# Patient Record
Sex: FEMALE | Race: WHITE | NOT HISPANIC OR LATINO
[De-identification: names, ages, dates, MRNs, and addresses within clinical notes are randomized per-mention and may not be internally consistent; named-entity substitution may affect disease eponyms.]

---

## 2019-10-01 ENCOUNTER — TRANSCRIPTION ENCOUNTER (OUTPATIENT)
Age: 84
End: 2019-10-01

## 2019-10-01 ENCOUNTER — INPATIENT (INPATIENT)
Facility: HOSPITAL | Age: 84
LOS: 2 days | Discharge: NURSING, REHAB OR LONG-TERM CARE | End: 2019-10-04
Attending: ORTHOPAEDIC SURGERY | Admitting: ORTHOPAEDIC SURGERY
Payer: MEDICARE

## 2019-10-01 VITALS
RESPIRATION RATE: 18 BRPM | WEIGHT: 119.93 LBS | TEMPERATURE: 98 F | HEART RATE: 94 BPM | OXYGEN SATURATION: 95 % | DIASTOLIC BLOOD PRESSURE: 77 MMHG | SYSTOLIC BLOOD PRESSURE: 155 MMHG

## 2019-10-01 DIAGNOSIS — S72.001A FRACTURE OF UNSPECIFIED PART OF NECK OF RIGHT FEMUR, INITIAL ENCOUNTER FOR CLOSED FRACTURE: ICD-10-CM

## 2019-10-01 LAB
ABO RH CONFIRMATION: SIGNIFICANT CHANGE UP
ALBUMIN SERPL ELPH-MCNC: 3.4 G/DL — SIGNIFICANT CHANGE UP (ref 3.3–5)
ALP SERPL-CCNC: 58 U/L — SIGNIFICANT CHANGE UP (ref 40–120)
ALT FLD-CCNC: 23 U/L — SIGNIFICANT CHANGE UP (ref 12–78)
ANION GAP SERPL CALC-SCNC: 9 MMOL/L — SIGNIFICANT CHANGE UP (ref 5–17)
APPEARANCE UR: CLEAR — SIGNIFICANT CHANGE UP
APTT BLD: 27 SEC — LOW (ref 27.5–36.3)
AST SERPL-CCNC: 17 U/L — SIGNIFICANT CHANGE UP (ref 15–37)
BILIRUB SERPL-MCNC: 0.6 MG/DL — SIGNIFICANT CHANGE UP (ref 0.2–1.2)
BILIRUB UR-MCNC: NEGATIVE — SIGNIFICANT CHANGE UP
BUN SERPL-MCNC: 12 MG/DL — SIGNIFICANT CHANGE UP (ref 7–23)
CALCIUM SERPL-MCNC: 9.4 MG/DL — SIGNIFICANT CHANGE UP (ref 8.5–10.1)
CHLORIDE SERPL-SCNC: 101 MMOL/L — SIGNIFICANT CHANGE UP (ref 96–108)
CO2 SERPL-SCNC: 27 MMOL/L — SIGNIFICANT CHANGE UP (ref 22–31)
COLOR SPEC: YELLOW — SIGNIFICANT CHANGE UP
CREAT SERPL-MCNC: 0.83 MG/DL — SIGNIFICANT CHANGE UP (ref 0.5–1.3)
DIFF PNL FLD: NEGATIVE — SIGNIFICANT CHANGE UP
GLUCOSE SERPL-MCNC: 136 MG/DL — HIGH (ref 70–99)
GLUCOSE UR QL: NEGATIVE MG/DL — SIGNIFICANT CHANGE UP
HCT VFR BLD CALC: 41 % — SIGNIFICANT CHANGE UP (ref 34.5–45)
HGB BLD-MCNC: 13.4 G/DL — SIGNIFICANT CHANGE UP (ref 11.5–15.5)
INR BLD: 1.08 RATIO — SIGNIFICANT CHANGE UP (ref 0.88–1.16)
KETONES UR-MCNC: NEGATIVE — SIGNIFICANT CHANGE UP
LEUKOCYTE ESTERASE UR-ACNC: NEGATIVE — SIGNIFICANT CHANGE UP
MCHC RBC-ENTMCNC: 27.7 PG — SIGNIFICANT CHANGE UP (ref 27–34)
MCHC RBC-ENTMCNC: 32.7 GM/DL — SIGNIFICANT CHANGE UP (ref 32–36)
MCV RBC AUTO: 84.7 FL — SIGNIFICANT CHANGE UP (ref 80–100)
NITRITE UR-MCNC: NEGATIVE — SIGNIFICANT CHANGE UP
NRBC # BLD: 0 /100 WBCS — SIGNIFICANT CHANGE UP (ref 0–0)
PH UR: 6.5 — SIGNIFICANT CHANGE UP (ref 5–8)
PLATELET # BLD AUTO: 165 K/UL — SIGNIFICANT CHANGE UP (ref 150–400)
POTASSIUM SERPL-MCNC: 4.1 MMOL/L — SIGNIFICANT CHANGE UP (ref 3.5–5.3)
POTASSIUM SERPL-SCNC: 4.1 MMOL/L — SIGNIFICANT CHANGE UP (ref 3.5–5.3)
PROT SERPL-MCNC: 7.5 GM/DL — SIGNIFICANT CHANGE UP (ref 6–8.3)
PROT UR-MCNC: NEGATIVE MG/DL — SIGNIFICANT CHANGE UP
PROTHROM AB SERPL-ACNC: 12.1 SEC — SIGNIFICANT CHANGE UP (ref 10–12.9)
RBC # BLD: 4.84 M/UL — SIGNIFICANT CHANGE UP (ref 3.8–5.2)
RBC # FLD: 15.9 % — HIGH (ref 10.3–14.5)
SODIUM SERPL-SCNC: 137 MMOL/L — SIGNIFICANT CHANGE UP (ref 135–145)
SP GR SPEC: 1.01 — SIGNIFICANT CHANGE UP (ref 1.01–1.02)
TROPONIN I SERPL-MCNC: <.015 NG/ML — SIGNIFICANT CHANGE UP (ref 0.01–0.04)
UROBILINOGEN FLD QL: NEGATIVE MG/DL — SIGNIFICANT CHANGE UP
WBC # BLD: 8.07 K/UL — SIGNIFICANT CHANGE UP (ref 3.8–10.5)
WBC # FLD AUTO: 8.07 K/UL — SIGNIFICANT CHANGE UP (ref 3.8–10.5)

## 2019-10-01 PROCEDURE — 88305 TISSUE EXAM BY PATHOLOGIST: CPT | Mod: 26

## 2019-10-01 PROCEDURE — 93010 ELECTROCARDIOGRAM REPORT: CPT

## 2019-10-01 PROCEDURE — 99285 EMERGENCY DEPT VISIT HI MDM: CPT

## 2019-10-01 PROCEDURE — 73552 X-RAY EXAM OF FEMUR 2/>: CPT | Mod: 26,RT

## 2019-10-01 PROCEDURE — 73502 X-RAY EXAM HIP UNI 2-3 VIEWS: CPT | Mod: 26,RT

## 2019-10-01 PROCEDURE — 70450 CT HEAD/BRAIN W/O DYE: CPT | Mod: 26

## 2019-10-01 PROCEDURE — 76377 3D RENDER W/INTRP POSTPROCES: CPT | Mod: 26

## 2019-10-01 PROCEDURE — 71045 X-RAY EXAM CHEST 1 VIEW: CPT | Mod: 26

## 2019-10-01 PROCEDURE — 88311 DECALCIFY TISSUE: CPT | Mod: 26

## 2019-10-01 PROCEDURE — 72192 CT PELVIS W/O DYE: CPT | Mod: 26

## 2019-10-01 PROCEDURE — 99222 1ST HOSP IP/OBS MODERATE 55: CPT

## 2019-10-01 RX ORDER — SERTRALINE 25 MG/1
100 TABLET, FILM COATED ORAL DAILY
Refills: 0 | Status: DISCONTINUED | OUTPATIENT
Start: 2019-10-01 | End: 2019-10-02

## 2019-10-01 RX ORDER — DONEPEZIL HYDROCHLORIDE 10 MG/1
1 TABLET, FILM COATED ORAL
Qty: 0 | Refills: 0 | DISCHARGE

## 2019-10-01 RX ORDER — ENOXAPARIN SODIUM 100 MG/ML
40 INJECTION SUBCUTANEOUS ONCE
Refills: 0 | Status: COMPLETED | OUTPATIENT
Start: 2019-10-01 | End: 2019-10-01

## 2019-10-01 RX ORDER — SIMVASTATIN 20 MG/1
20 TABLET, FILM COATED ORAL AT BEDTIME
Refills: 0 | Status: DISCONTINUED | OUTPATIENT
Start: 2019-10-01 | End: 2019-10-02

## 2019-10-01 RX ORDER — MORPHINE SULFATE 50 MG/1
2 CAPSULE, EXTENDED RELEASE ORAL ONCE
Refills: 0 | Status: DISCONTINUED | OUTPATIENT
Start: 2019-10-01 | End: 2019-10-01

## 2019-10-01 RX ORDER — METOPROLOL TARTRATE 50 MG
50 TABLET ORAL DAILY
Refills: 0 | Status: DISCONTINUED | OUTPATIENT
Start: 2019-10-01 | End: 2019-10-02

## 2019-10-01 RX ORDER — SIMVASTATIN 20 MG/1
1 TABLET, FILM COATED ORAL
Qty: 0 | Refills: 0 | DISCHARGE

## 2019-10-01 RX ORDER — SODIUM CHLORIDE 9 MG/ML
1000 INJECTION, SOLUTION INTRAVENOUS
Refills: 0 | Status: DISCONTINUED | OUTPATIENT
Start: 2019-10-01 | End: 2019-10-02

## 2019-10-01 RX ORDER — SERTRALINE 25 MG/1
1 TABLET, FILM COATED ORAL
Qty: 0 | Refills: 0 | DISCHARGE

## 2019-10-01 RX ORDER — RISPERIDONE 4 MG/1
2 TABLET ORAL
Refills: 0 | Status: DISCONTINUED | OUTPATIENT
Start: 2019-10-01 | End: 2019-10-02

## 2019-10-01 RX ORDER — INFLUENZA VIRUS VACCINE 15; 15; 15; 15 UG/.5ML; UG/.5ML; UG/.5ML; UG/.5ML
0.5 SUSPENSION INTRAMUSCULAR ONCE
Refills: 0 | Status: COMPLETED | OUTPATIENT
Start: 2019-10-01 | End: 2019-10-01

## 2019-10-01 RX ORDER — DOCUSATE SODIUM 100 MG
100 CAPSULE ORAL THREE TIMES A DAY
Refills: 0 | Status: DISCONTINUED | OUTPATIENT
Start: 2019-10-01 | End: 2019-10-02

## 2019-10-01 RX ORDER — DONEPEZIL HYDROCHLORIDE 10 MG/1
5 TABLET, FILM COATED ORAL AT BEDTIME
Refills: 0 | Status: DISCONTINUED | OUTPATIENT
Start: 2019-10-01 | End: 2019-10-02

## 2019-10-01 RX ORDER — OXYCODONE HYDROCHLORIDE 5 MG/1
5 TABLET ORAL EVERY 6 HOURS
Refills: 0 | Status: DISCONTINUED | OUTPATIENT
Start: 2019-10-01 | End: 2019-10-02

## 2019-10-01 RX ORDER — METOPROLOL TARTRATE 50 MG
1 TABLET ORAL
Qty: 0 | Refills: 0 | DISCHARGE

## 2019-10-01 RX ORDER — RISPERIDONE 4 MG/1
1 TABLET ORAL
Qty: 0 | Refills: 0 | DISCHARGE

## 2019-10-01 RX ADMIN — SIMVASTATIN 20 MILLIGRAM(S): 20 TABLET, FILM COATED ORAL at 23:14

## 2019-10-01 RX ADMIN — Medication 100 MILLIGRAM(S): at 23:14

## 2019-10-01 RX ADMIN — ENOXAPARIN SODIUM 40 MILLIGRAM(S): 100 INJECTION SUBCUTANEOUS at 16:03

## 2019-10-01 RX ADMIN — MORPHINE SULFATE 2 MILLIGRAM(S): 50 CAPSULE, EXTENDED RELEASE ORAL at 14:29

## 2019-10-01 RX ADMIN — DONEPEZIL HYDROCHLORIDE 5 MILLIGRAM(S): 10 TABLET, FILM COATED ORAL at 23:14

## 2019-10-01 RX ADMIN — RISPERIDONE 2 MILLIGRAM(S): 4 TABLET ORAL at 17:59

## 2019-10-01 RX ADMIN — MORPHINE SULFATE 2 MILLIGRAM(S): 50 CAPSULE, EXTENDED RELEASE ORAL at 14:24

## 2019-10-01 NOTE — CONSULT NOTE ADULT - PROBLEM SELECTOR RECOMMENDATION 9
Consulted for medical clearance  Please get TTE in am and if normal - proceed to surgery with low risk

## 2019-10-01 NOTE — CONSULT NOTE ADULT - ASSESSMENT
Patient is an 83 YO F with a PMH of Dementia, HTN, and HLD who presented to the ED s/p mechanical fall and found to have R hip fracture.  Consulted by surgery for medical clearance.  Patient has dementia and HTN but no other significant comorbidities.  Will recommend TTE as functional status is questionable secondary to patient's dementia    Will order bilateral mittens as patient continues to fidget and pull at gregg cath    If TTE with abnormal findings - patient can proceed to surgery with low risk of perioperative cardiovascular mortality.

## 2019-10-01 NOTE — ED PROVIDER NOTE - OBJECTIVE STATEMENT
83yo female from home with pmh dementia, HL, HTN,  presents s/p fall.  heard her fall and came around the corner and she was on the ground and awake.  noted her gait has been off and having rt hip pain which he attributed to arthritis. Pt is AOx1. Pt has been unable to ambulate since.    Per family, no fever, vomiting, pain, sob, diarrhea

## 2019-10-01 NOTE — ED PROVIDER NOTE - DISPOSITION TYPE
PHYSICAL THERAPY KNEE TREATMENT NOTE - INPATIENT     Room Number: 756/928-K             Presenting Problem: R TKA    Problem List  Principal Problem:    Arthritis of right knee  Active Problems:    S/P knee replacement    Essential hypertension    Renal in Degree of Impairment Score: 50.57%   Standardized Score (AM-PAC Scale): 42.13   CMS Modifier (G-Code): CK    FUNCTIONAL ABILITY STATUS  Gait Assessment   Gait Assistance: Minimum assistance  Distance (ft): 75  Assistive Device: Rolling walker  Pattern: R D ADMIT

## 2019-10-01 NOTE — ED ADULT TRIAGE NOTE - CHIEF COMPLAINT QUOTE
pt BIB  with c/o fall yesterday now p/w right hip pain, unable to ambulate., unsure of the mechanism unsure if she struck her head, not on any anticoagulants no obvious head trauma, at baseline mental status

## 2019-10-01 NOTE — H&P ADULT - PROBLEM SELECTOR PLAN 1
Pain Control  Admit to Ortho   Pre-op   DVT prophylaxis   F/U Medical Optimization   D/W Dr. Rani Zurita to F/U

## 2019-10-01 NOTE — H&P ADULT - NSHPPHYSICALEXAM_GEN_ALL_CORE
Right Hip/LE: Skin C/D/I, (+) TTP, (+) Pain with Log Roll, Sensation/motor intact, DP 2+, FROM ankle/toes

## 2019-10-01 NOTE — H&P ADULT - HISTORY OF PRESENT ILLNESS
Patient S/P Fall at Home, as per  and Son, Patient was walking into another room when  heard her fall, Patient C/O Right Hip pain, Patient is a Poor Historian due to PMHx of Dementia

## 2019-10-01 NOTE — CONSULT NOTE ADULT - SUBJECTIVE AND OBJECTIVE BOX
Patient is an 85 YO F with a PMH of Dementia, HTN, and HLD who presented to the ED s/p mechanical fall and found to have R hip fracture.  Consulted by surgery for medical clearance.      Patient seen and examined at the bedside  AAOx1 - unable to provide history however some history provided by family members at the bedside.    Patient reportedly was able to get out of bed and ambulate without assistance prior to her fall  Patient reportedly has a smoking history but quit over 30 years ago.    Family denies hx of MI, reports this is her first hospitalization    Vitals T97.6 HR 83 /77  Physical exam:  General: patient in no acute distress, resting comfortably, pulling at gregg catheter  Cardio: S1/S2 +ve, regular rate and rhythm, no M/G/R  Resp: clear to ausculation bilaterally, no rales or wheezes  GI: abdomen soft, nontender, non distended, no guarding, BS +ve x 4  : gregg in place  Ext: no significant pedal edema

## 2019-10-02 ENCOUNTER — RESULT REVIEW (OUTPATIENT)
Age: 84
End: 2019-10-02

## 2019-10-02 LAB
ANION GAP SERPL CALC-SCNC: 7 MMOL/L — SIGNIFICANT CHANGE UP (ref 5–17)
ANION GAP SERPL CALC-SCNC: 9 MMOL/L — SIGNIFICANT CHANGE UP (ref 5–17)
BASOPHILS # BLD AUTO: 0.02 K/UL — SIGNIFICANT CHANGE UP (ref 0–0.2)
BASOPHILS NFR BLD AUTO: 0.3 % — SIGNIFICANT CHANGE UP (ref 0–2)
BUN SERPL-MCNC: 10 MG/DL — SIGNIFICANT CHANGE UP (ref 7–23)
BUN SERPL-MCNC: 11 MG/DL — SIGNIFICANT CHANGE UP (ref 7–23)
CALCIUM SERPL-MCNC: 8.3 MG/DL — LOW (ref 8.5–10.1)
CALCIUM SERPL-MCNC: 8.6 MG/DL — SIGNIFICANT CHANGE UP (ref 8.5–10.1)
CHLORIDE SERPL-SCNC: 102 MMOL/L — SIGNIFICANT CHANGE UP (ref 96–108)
CHLORIDE SERPL-SCNC: 102 MMOL/L — SIGNIFICANT CHANGE UP (ref 96–108)
CO2 SERPL-SCNC: 26 MMOL/L — SIGNIFICANT CHANGE UP (ref 22–31)
CO2 SERPL-SCNC: 27 MMOL/L — SIGNIFICANT CHANGE UP (ref 22–31)
CREAT SERPL-MCNC: 0.7 MG/DL — SIGNIFICANT CHANGE UP (ref 0.5–1.3)
CREAT SERPL-MCNC: 0.85 MG/DL — SIGNIFICANT CHANGE UP (ref 0.5–1.3)
CULTURE RESULTS: SIGNIFICANT CHANGE UP
EOSINOPHIL # BLD AUTO: 0.29 K/UL — SIGNIFICANT CHANGE UP (ref 0–0.5)
EOSINOPHIL NFR BLD AUTO: 4.8 % — SIGNIFICANT CHANGE UP (ref 0–6)
GLUCOSE SERPL-MCNC: 101 MG/DL — HIGH (ref 70–99)
GLUCOSE SERPL-MCNC: 113 MG/DL — HIGH (ref 70–99)
HCT VFR BLD CALC: 35.9 % — SIGNIFICANT CHANGE UP (ref 34.5–45)
HCT VFR BLD CALC: 37 % — SIGNIFICANT CHANGE UP (ref 34.5–45)
HGB BLD-MCNC: 11.7 G/DL — SIGNIFICANT CHANGE UP (ref 11.5–15.5)
HGB BLD-MCNC: 12 G/DL — SIGNIFICANT CHANGE UP (ref 11.5–15.5)
IMM GRANULOCYTES NFR BLD AUTO: 0.3 % — SIGNIFICANT CHANGE UP (ref 0–1.5)
LYMPHOCYTES # BLD AUTO: 0.92 K/UL — LOW (ref 1–3.3)
LYMPHOCYTES # BLD AUTO: 15.3 % — SIGNIFICANT CHANGE UP (ref 13–44)
MCHC RBC-ENTMCNC: 27.5 PG — SIGNIFICANT CHANGE UP (ref 27–34)
MCHC RBC-ENTMCNC: 27.9 PG — SIGNIFICANT CHANGE UP (ref 27–34)
MCHC RBC-ENTMCNC: 32.4 GM/DL — SIGNIFICANT CHANGE UP (ref 32–36)
MCHC RBC-ENTMCNC: 32.6 GM/DL — SIGNIFICANT CHANGE UP (ref 32–36)
MCV RBC AUTO: 84.3 FL — SIGNIFICANT CHANGE UP (ref 80–100)
MCV RBC AUTO: 86 FL — SIGNIFICANT CHANGE UP (ref 80–100)
MONOCYTES # BLD AUTO: 0.64 K/UL — SIGNIFICANT CHANGE UP (ref 0–0.9)
MONOCYTES NFR BLD AUTO: 10.6 % — SIGNIFICANT CHANGE UP (ref 2–14)
NEUTROPHILS # BLD AUTO: 4.12 K/UL — SIGNIFICANT CHANGE UP (ref 1.8–7.4)
NEUTROPHILS NFR BLD AUTO: 68.7 % — SIGNIFICANT CHANGE UP (ref 43–77)
NRBC # BLD: 0 /100 WBCS — SIGNIFICANT CHANGE UP (ref 0–0)
NRBC # BLD: 0 /100 WBCS — SIGNIFICANT CHANGE UP (ref 0–0)
PLATELET # BLD AUTO: 145 K/UL — LOW (ref 150–400)
PLATELET # BLD AUTO: 154 K/UL — SIGNIFICANT CHANGE UP (ref 150–400)
POTASSIUM SERPL-MCNC: 4.1 MMOL/L — SIGNIFICANT CHANGE UP (ref 3.5–5.3)
POTASSIUM SERPL-MCNC: 4.2 MMOL/L — SIGNIFICANT CHANGE UP (ref 3.5–5.3)
POTASSIUM SERPL-SCNC: 4.1 MMOL/L — SIGNIFICANT CHANGE UP (ref 3.5–5.3)
POTASSIUM SERPL-SCNC: 4.2 MMOL/L — SIGNIFICANT CHANGE UP (ref 3.5–5.3)
RBC # BLD: 4.26 M/UL — SIGNIFICANT CHANGE UP (ref 3.8–5.2)
RBC # BLD: 4.3 M/UL — SIGNIFICANT CHANGE UP (ref 3.8–5.2)
RBC # FLD: 16.2 % — HIGH (ref 10.3–14.5)
RBC # FLD: 16.4 % — HIGH (ref 10.3–14.5)
SODIUM SERPL-SCNC: 136 MMOL/L — SIGNIFICANT CHANGE UP (ref 135–145)
SODIUM SERPL-SCNC: 137 MMOL/L — SIGNIFICANT CHANGE UP (ref 135–145)
SPECIMEN SOURCE: SIGNIFICANT CHANGE UP
WBC # BLD: 6.01 K/UL — SIGNIFICANT CHANGE UP (ref 3.8–10.5)
WBC # BLD: 7.24 K/UL — SIGNIFICANT CHANGE UP (ref 3.8–10.5)
WBC # FLD AUTO: 6.01 K/UL — SIGNIFICANT CHANGE UP (ref 3.8–10.5)
WBC # FLD AUTO: 7.24 K/UL — SIGNIFICANT CHANGE UP (ref 3.8–10.5)

## 2019-10-02 PROCEDURE — 99233 SBSQ HOSP IP/OBS HIGH 50: CPT

## 2019-10-02 RX ORDER — RISPERIDONE 4 MG/1
2 TABLET ORAL
Refills: 0 | Status: DISCONTINUED | OUTPATIENT
Start: 2019-10-02 | End: 2019-10-04

## 2019-10-02 RX ORDER — SENNA PLUS 8.6 MG/1
2 TABLET ORAL AT BEDTIME
Refills: 0 | Status: DISCONTINUED | OUTPATIENT
Start: 2019-10-02 | End: 2019-10-04

## 2019-10-02 RX ORDER — CEFAZOLIN SODIUM 1 G
2000 VIAL (EA) INJECTION EVERY 8 HOURS
Refills: 0 | Status: COMPLETED | OUTPATIENT
Start: 2019-10-03 | End: 2019-10-03

## 2019-10-02 RX ORDER — ASCORBIC ACID 60 MG
500 TABLET,CHEWABLE ORAL
Refills: 0 | Status: DISCONTINUED | OUTPATIENT
Start: 2019-10-02 | End: 2019-10-04

## 2019-10-02 RX ORDER — SODIUM CHLORIDE 9 MG/ML
1000 INJECTION, SOLUTION INTRAVENOUS
Refills: 0 | Status: DISCONTINUED | OUTPATIENT
Start: 2019-10-02 | End: 2019-10-02

## 2019-10-02 RX ORDER — METOPROLOL TARTRATE 50 MG
50 TABLET ORAL DAILY
Refills: 0 | Status: DISCONTINUED | OUTPATIENT
Start: 2019-10-02 | End: 2019-10-04

## 2019-10-02 RX ORDER — SIMVASTATIN 20 MG/1
20 TABLET, FILM COATED ORAL AT BEDTIME
Refills: 0 | Status: DISCONTINUED | OUTPATIENT
Start: 2019-10-02 | End: 2019-10-04

## 2019-10-02 RX ORDER — MAGNESIUM HYDROXIDE 400 MG/1
30 TABLET, CHEWABLE ORAL EVERY 12 HOURS
Refills: 0 | Status: DISCONTINUED | OUTPATIENT
Start: 2019-10-02 | End: 2019-10-04

## 2019-10-02 RX ORDER — SERTRALINE 25 MG/1
100 TABLET, FILM COATED ORAL DAILY
Refills: 0 | Status: DISCONTINUED | OUTPATIENT
Start: 2019-10-02 | End: 2019-10-04

## 2019-10-02 RX ORDER — TRAMADOL HYDROCHLORIDE 50 MG/1
50 TABLET ORAL EVERY 6 HOURS
Refills: 0 | Status: DISCONTINUED | OUTPATIENT
Start: 2019-10-02 | End: 2019-10-03

## 2019-10-02 RX ORDER — FENTANYL CITRATE 50 UG/ML
50 INJECTION INTRAVENOUS ONCE
Refills: 0 | Status: DISCONTINUED | OUTPATIENT
Start: 2019-10-02 | End: 2019-10-02

## 2019-10-02 RX ORDER — ACETAMINOPHEN 500 MG
1000 TABLET ORAL ONCE
Refills: 0 | Status: COMPLETED | OUTPATIENT
Start: 2019-10-02 | End: 2019-10-02

## 2019-10-02 RX ORDER — ACETAMINOPHEN 500 MG
975 TABLET ORAL EVERY 8 HOURS
Refills: 0 | Status: DISCONTINUED | OUTPATIENT
Start: 2019-10-02 | End: 2019-10-04

## 2019-10-02 RX ORDER — DOCUSATE SODIUM 100 MG
100 CAPSULE ORAL THREE TIMES A DAY
Refills: 0 | Status: DISCONTINUED | OUTPATIENT
Start: 2019-10-02 | End: 2019-10-04

## 2019-10-02 RX ORDER — HYDROMORPHONE HYDROCHLORIDE 2 MG/ML
0.5 INJECTION INTRAMUSCULAR; INTRAVENOUS; SUBCUTANEOUS
Refills: 0 | Status: DISCONTINUED | OUTPATIENT
Start: 2019-10-02 | End: 2019-10-02

## 2019-10-02 RX ORDER — FOLIC ACID 0.8 MG
1 TABLET ORAL DAILY
Refills: 0 | Status: DISCONTINUED | OUTPATIENT
Start: 2019-10-02 | End: 2019-10-04

## 2019-10-02 RX ORDER — ENOXAPARIN SODIUM 100 MG/ML
40 INJECTION SUBCUTANEOUS DAILY
Refills: 0 | Status: DISCONTINUED | OUTPATIENT
Start: 2019-10-03 | End: 2019-10-04

## 2019-10-02 RX ORDER — ONDANSETRON 8 MG/1
4 TABLET, FILM COATED ORAL EVERY 6 HOURS
Refills: 0 | Status: DISCONTINUED | OUTPATIENT
Start: 2019-10-02 | End: 2019-10-04

## 2019-10-02 RX ORDER — DONEPEZIL HYDROCHLORIDE 10 MG/1
5 TABLET, FILM COATED ORAL AT BEDTIME
Refills: 0 | Status: DISCONTINUED | OUTPATIENT
Start: 2019-10-02 | End: 2019-10-04

## 2019-10-02 RX ORDER — POLYETHYLENE GLYCOL 3350 17 G/17G
17 POWDER, FOR SOLUTION ORAL DAILY
Refills: 0 | Status: DISCONTINUED | OUTPATIENT
Start: 2019-10-02 | End: 2019-10-04

## 2019-10-02 RX ORDER — SODIUM CHLORIDE 9 MG/ML
1000 INJECTION INTRAMUSCULAR; INTRAVENOUS; SUBCUTANEOUS
Refills: 0 | Status: DISCONTINUED | OUTPATIENT
Start: 2019-10-02 | End: 2019-10-03

## 2019-10-02 RX ORDER — TRAMADOL HYDROCHLORIDE 50 MG/1
100 TABLET ORAL EVERY 6 HOURS
Refills: 0 | Status: DISCONTINUED | OUTPATIENT
Start: 2019-10-02 | End: 2019-10-03

## 2019-10-02 RX ORDER — ONDANSETRON 8 MG/1
4 TABLET, FILM COATED ORAL ONCE
Refills: 0 | Status: DISCONTINUED | OUTPATIENT
Start: 2019-10-02 | End: 2019-10-02

## 2019-10-02 RX ORDER — PANTOPRAZOLE SODIUM 20 MG/1
40 TABLET, DELAYED RELEASE ORAL
Refills: 0 | Status: DISCONTINUED | OUTPATIENT
Start: 2019-10-02 | End: 2019-10-04

## 2019-10-02 RX ADMIN — SODIUM CHLORIDE 100 MILLILITER(S): 9 INJECTION, SOLUTION INTRAVENOUS at 06:06

## 2019-10-02 RX ADMIN — Medication 100 MILLIGRAM(S): at 06:43

## 2019-10-02 RX ADMIN — Medication 50 MILLIGRAM(S): at 06:43

## 2019-10-02 RX ADMIN — Medication 975 MILLIGRAM(S): at 23:00

## 2019-10-02 RX ADMIN — RISPERIDONE 2 MILLIGRAM(S): 4 TABLET ORAL at 06:43

## 2019-10-02 RX ADMIN — RISPERIDONE 2 MILLIGRAM(S): 4 TABLET ORAL at 22:08

## 2019-10-02 RX ADMIN — DONEPEZIL HYDROCHLORIDE 5 MILLIGRAM(S): 10 TABLET, FILM COATED ORAL at 22:09

## 2019-10-02 RX ADMIN — Medication 1000 MILLIGRAM(S): at 17:45

## 2019-10-02 RX ADMIN — Medication 975 MILLIGRAM(S): at 22:08

## 2019-10-02 RX ADMIN — Medication 100 MILLIGRAM(S): at 22:09

## 2019-10-02 RX ADMIN — SERTRALINE 100 MILLIGRAM(S): 25 TABLET, FILM COATED ORAL at 11:31

## 2019-10-02 RX ADMIN — SIMVASTATIN 20 MILLIGRAM(S): 20 TABLET, FILM COATED ORAL at 22:08

## 2019-10-02 RX ADMIN — SODIUM CHLORIDE 100 MILLILITER(S): 9 INJECTION INTRAMUSCULAR; INTRAVENOUS; SUBCUTANEOUS at 20:09

## 2019-10-02 RX ADMIN — SODIUM CHLORIDE 75 MILLILITER(S): 9 INJECTION, SOLUTION INTRAVENOUS at 17:15

## 2019-10-02 RX ADMIN — Medication 400 MILLIGRAM(S): at 17:30

## 2019-10-02 NOTE — DISCHARGE NOTE PROVIDER - NSDCACTIVITY_GEN_ALL_CORE
Stairs allowed/No heavy lifting/straining/Walking - Indoors allowed/Do not make important decisions/Walking - Outdoors allowed/Do not drive or operate machinery/Showering allowed

## 2019-10-02 NOTE — DISCHARGE NOTE PROVIDER - CARE PROVIDER_API CALL
Ángel Saravia)  Orthopaedic Surgery  [address] [place] [address]  Phone: [phone]  Fax: [phone]  Follow Up Time:

## 2019-10-02 NOTE — PROGRESS NOTE ADULT - ASSESSMENT
Right Femoral Neck Fracture status post fall  Medically optimized for Ortho procedure  No need for echo to be done pre-op to assess functional status as the risk of not having procedure outweighs risk of surgery  continue low dose IV fluids - decreased dose to 50 cc/hr    Advanced dementia  supportive care  continue Aricept Right Femoral Neck Fracture status post fall  Medically optimized for Ortho procedure  No need for echo to be done pre-op to assess functional status as the risk of not having procedure outweighs risk of surgery  continue low dose IV fluids - decreased dose to 50 cc/hr  gregg in place - remove post-op when possible    Hypertension - stable  continue current dose of beta blocker    Hyperlipidemia  continue statin therapy    Advanced dementia  supportive care  continue Aricept

## 2019-10-02 NOTE — PROGRESS NOTE ADULT - SUBJECTIVE AND OBJECTIVE BOX
Patient is a 84y old  Female who presents with a chief complaint of Right Hip Fracture status post fall at home.    INTERVAL HPI/OVERNIGHT EVENTS:  Non-verbal, resting comfortably.    MEDICATIONS  (STANDING):  docusate sodium 100 milliGRAM(s) Oral three times a day  donepezil 5 milliGRAM(s) Oral at bedtime  influenza   Vaccine 0.5 milliLiter(s) IntraMuscular once  lactated ringers. 1000 milliLiter(s) (50 mL/Hr) IV Continuous <Continuous>  metoprolol succinate ER 50 milliGRAM(s) Oral daily  risperiDONE   Tablet 2 milliGRAM(s) Oral two times a day  sertraline 100 milliGRAM(s) Oral daily  simvastatin 20 milliGRAM(s) Oral at bedtime    MEDICATIONS  (PRN):  oxyCODONE    IR 5 milliGRAM(s) Oral every 6 hours PRN Pain 1-10    Allergies:  No Known Allergies    REVIEW OF SYSTEMS:  All other systems reviewed and are negative    Vital Signs Last 24 Hrs  T(C): 36.1 (02 Oct 2019 06:01), Max: 36.5 (02 Oct 2019 00:20)  T(F): 96.9 (02 Oct 2019 06:01), Max: 97.7 (02 Oct 2019 00:20)  HR: 83 (02 Oct 2019 06:01) (82 - 94)  BP: 120/69 (02 Oct 2019 06:01) (120/69 - 162/84)  BP(mean): 103 (01 Oct 2019 17:03) (103 - 103)  RR: 18 (02 Oct 2019 06:01) (17 - 18)  SpO2: 94% (02 Oct 2019 06:01) (94% - 97%)  Daily     Daily Weight in k.4 (02 Oct 2019 06:01)  I&O's Summary    01 Oct 2019 07:01  -  02 Oct 2019 07:00  --------------------------------------------------------  IN: 1200 mL / OUT: 1000 mL / NET: 200 mL    PHYSICAL EXAM:  GENERAL: NAD, well-groomed, well-developed  HEAD:  Atraumatic, Normocephalic  EYES: EOMI, PERRLA, conjunctiva and sclera clear  ENMT: No tonsillar erythema, exudates, or enlargement; Moist mucous membranes, Good dentition, No lesions  NECK: Supple, No JVD, Normal thyroid  NERVOUS SYSTEM:  sleepy, uncooperative with neurology exam, no gross focal deficits  CHEST/LUNG: Clear to percussion bilaterally; No rales, rhonchi, wheezing, or rubs  HEART: Regular rate and rhythm; No murmurs, rubs, or gallops  ABDOMEN: Soft, Nontender, Nondistended; Bowel sounds present  EXTREMITIES:  2+ Peripheral Pulses, No clubbing, cyanosis, or edema  LYMPH: No lymphadenopathy noted  SKIN: No rashes or lesions    Labs                      11.7   6.01  )-----------( 145      ( 02 Oct 2019 05:17 )             35.9     10    136  |  102  |  11  ----------------------------<  101<H>  4.1   |  27  |  0.70    Ca    8.3<L>      02 Oct 2019 05:17    TPro  7.5  /  Alb  3.4  /  TBili  0.6  /  DBili  x   /  AST  17  /  ALT  23  /  AlkPhos  58  10-    PT/INR - ( 01 Oct 2019 11:51 )   PT: 12.1 sec;   INR: 1.08 ratio    PTT - ( 01 Oct 2019 11:51 )  PTT:27.0 sec    CARDIAC MARKERS ( 01 Oct 2019 11:51 )  <.015 ng/mL / x     / x     / x     / x        Urinalysis Basic - ( 01 Oct 2019 16:15 )    Color: Yellow / Appearance: Clear / S.010 / pH: x  Gluc: x / Ketone: Negative  / Bili: Negative / Urobili: Negative mg/dL   Blood: x / Protein: Negative mg/dL / Nitrite: Negative   Leuk Esterase: Negative / RBC: x / WBC x   Sq Epi: x / Non Sq Epi: x / Bacteria: x      < from: CT 3D Reconstruct w/ Workstation (10.01.19 @ 14:10) >  Impression:    Acute comminuted right femoral neck fracture involving both the   subcapital and basicervical regions.    Nondisplaced fracture along the left-sided marginal osteophyte at the   level of L4.    < from: CT Head No Cont (10.01.19 @ 14:10) >  Impression:  1. no acute findings.    < from: Xray Chest 1 View AP/PA. (10.01.19 @ 11:55) >  Technique: XR CHEST portable AP    Comparison:None    Findings:  Lines: None    Heart/Mediastinum/Lungs: The heart size is normal.The lungs are   clear.There are no pleural effusions.    EKG - NSR    DVT prophylaxis: SCD's

## 2019-10-02 NOTE — DISCHARGE NOTE PROVIDER - HOSPITAL COURSE
84yFemale with Right hip fracture resenting for Right hip hemiarthroplasty by Dr. Saravia on 10/2/19. Risk and benefits of surgery were explained to the patient.The patient understood and agreed to proceed with surgery. Patient underwent the procedure with no intraoperative complications. Pt was brought in stable condition to the PACU. Once stable in PACU, pt was brought to the floor. During hospital stay pt was followed by Medicine, physical therapy, Home Care during this admission. Pt had an uneventful hospital course. Pt is stable for discharge to [home/rehab] 84yFemale with Right hip fracture resenting for Right hip hemiarthroplasty by Dr. Saravia on 10/2/19. Risk and benefits of surgery were explained to the patient.The patient understood and agreed to proceed with surgery. Patient underwent the procedure with no intraoperative complications. Pt was brought in stable condition to the PACU. Once stable in PACU, pt was brought to the floor. During hospital stay pt was followed by Medicine, physical therapy, Home Care during this admission. Pt had an uneventful hospital course. Pt is stable for discharge to rehab on POD#1. 84yFemale with Right hip fracture resenting for Right hip hemiarthroplasty by Dr. Saravia on 10/2/19. Risk and benefits of surgery were explained to the patient.The patient understood and agreed to proceed with surgery. Patient underwent the procedure with no intraoperative complications. Pt was brought in stable condition to the PACU. Once stable in PACU, pt was brought to the floor. During hospital stay pt was followed by Medicine, physical therapy, Home Care during this admission. Pt had an uneventful hospital course. Pt is stable for discharge to rehab on POD#2.

## 2019-10-02 NOTE — DISCHARGE NOTE PROVIDER - NSDCFUADDINST_GEN_ALL_CORE_FT
Keep Prineo Dressing Clean, Dry and Intact. May shower with Prineo Dressing. Please do not scrub, soak, peel or pick at the prineo dressing. No creams, lotions, or oils over dressing. May shower and let water run over incision, no baths. Pat dry once out of shower. Dressing to be removed in office at follow up visit in 2 weeks.     Per Dr. Saravia: may advance from walker as tolerated per discretion of physical therapist. Keep Prineo Dressing Clean, Dry and Intact. May shower with Prineo Dressing. Please do not scrub, soak, peel or pick at the prineo dressing. No creams, lotions, or oils over dressing. May shower and let water run over incision, no baths. Pat dry once out of shower. Dressing to be removed in office at follow up visit in 2 weeks.     Hip replacement precautions: Abduction pillow. Elevate the leg (while keeping hip precautions) as often as possible to help control swelling.    Per Dr. Saravia: may advance from walker as tolerated per discretion of physical therapist.

## 2019-10-02 NOTE — DISCHARGE NOTE PROVIDER - NSDCFUADDAPPT_GEN_ALL_CORE_FT
Follow up with Dr. Saravia in 2 weeks. Please call [phone] for appointment.      Follow up with your primary care doctor within 1 week to monitor your blood work. Please call to make an appointment.    Please call your surgeon, if you have new onset of fevers, increased drainage, increased pain or increased redness around the incision site. Please return to the Emergency Department if you have chest pain or shortness of breath.

## 2019-10-02 NOTE — DISCHARGE NOTE PROVIDER - NSDCCPCAREPLAN_GEN_ALL_CORE_FT
PRINCIPAL DISCHARGE DIAGNOSIS  Diagnosis: Hip fracture  Assessment and Plan of Treatment: PRINCIPAL DISCHARGE DIAGNOSIS  Diagnosis: Hip fracture  Assessment and Plan of Treatment:       SECONDARY DISCHARGE DIAGNOSES  Diagnosis: Hypokalemia  Assessment and Plan of Treatment: repleted

## 2019-10-02 NOTE — PROGRESS NOTE ADULT - SUBJECTIVE AND OBJECTIVE BOX
84yFemale s/p R hip hemiarthroplasty POD#0 by Dr. Saravia. Pt seen and examined in NAD. Pain controlled. Pt denies any new complaints. Pt denies CP/SOB/N/V/D/numbness/tingling/bowel or bladder dysfunction.     PE:   RLE: dressing c/d/i. +ROM ankle/toes. Calf: soft, compressible and nontender. DP/PT 2+ NVI.                           12.0   7.24  )-----------( 154      ( 02 Oct 2019 18:22 )             37.0       10-02    136  |  102  |  11  ----------------------------<  101<H>  4.1   |  27  |  0.70    Ca    8.3<L>      02 Oct 2019 05:17    TPro  7.5  /  Alb  3.4  /  TBili  0.6  /  DBili  x   /  AST  17  /  ALT  23  /  AlkPhos  58  10-01        A/P: 84yFemale s/p R hip hemiarthroplasty POD#0  Pain controlled  Posterior hip precautions  PT: WBAT   DVT ppx: SCDs/lovenox 40mg SQ once a day    Wound care, Isometric exercises, incentive spirometry   Discharge: planning   All the above discussed and understood by pt

## 2019-10-02 NOTE — PROGRESS NOTE ADULT - SUBJECTIVE AND OBJECTIVE BOX
Patient is seen and examined at bedside with Dr. Saravia. Pt son at bedside. Denies complaints.   Vital Signs Last 24 Hrs  T(C): 36.6 (02 Oct 2019 10:50), Max: 36.6 (02 Oct 2019 10:50)  T(F): 97.9 (02 Oct 2019 10:50), Max: 97.9 (02 Oct 2019 10:50)  HR: 96 (02 Oct 2019 10:50) (82 - 96)  BP: 138/69 (02 Oct 2019 10:50) (120/69 - 162/84)  BP(mean): 103 (01 Oct 2019 17:03) (103 - 103)  RR: 18 (02 Oct 2019 10:50) (17 - 18)  SpO2: 96% (02 Oct 2019 10:50) (94% - 97%)    GEN: NAD  ABD: soft, NT/ND; no rebound or guarding;  Neurologic: Confused.   RLE: Skin intact. +pain with log roll.   LLE: no calf tenderness. No log roll tenderness.     Labs:                          11.7   6.01  )-----------( 145      ( 02 Oct 2019 05:17 )             35.9       10-02    136  |  102  |  11  ----------------------------<  101<H>  4.1   |  27  |  0.70    Ca    8.3<L>      02 Oct 2019 05:17    TPro  7.5  /  Alb  3.4  /  TBili  0.6  /  DBili  x   /  AST  17  /  ALT  23  /  AlkPhos  58  10-01      A/P: Patient is a 84y y/o Female s/p fall with femoral neck fracture    -Pt seen with Dr. Saravia: surgical planning discussed with pt's sons.   -Pain control/analgesia  -Inc spirometry  -Venodynes/foot pumps  -F/U AM Labs  -PT/OT/WBAT  -Antibiotic per SCIPS   -Anticoagulation  -Discharge planning reviewed with pt son at bedside as well.  -Pt with dementia, poor historian, will follow. For OR today. Medically optimized. Does not need TTE preop.

## 2019-10-03 LAB
ANION GAP SERPL CALC-SCNC: 8 MMOL/L — SIGNIFICANT CHANGE UP (ref 5–17)
BUN SERPL-MCNC: 14 MG/DL — SIGNIFICANT CHANGE UP (ref 7–23)
CALCIUM SERPL-MCNC: 8.2 MG/DL — LOW (ref 8.5–10.1)
CHLORIDE SERPL-SCNC: 103 MMOL/L — SIGNIFICANT CHANGE UP (ref 96–108)
CO2 SERPL-SCNC: 27 MMOL/L — SIGNIFICANT CHANGE UP (ref 22–31)
CREAT SERPL-MCNC: 0.6 MG/DL — SIGNIFICANT CHANGE UP (ref 0.5–1.3)
GLUCOSE SERPL-MCNC: 110 MG/DL — HIGH (ref 70–99)
HCT VFR BLD CALC: 36.8 % — SIGNIFICANT CHANGE UP (ref 34.5–45)
HGB BLD-MCNC: 11.6 G/DL — SIGNIFICANT CHANGE UP (ref 11.5–15.5)
MCHC RBC-ENTMCNC: 27.1 PG — SIGNIFICANT CHANGE UP (ref 27–34)
MCHC RBC-ENTMCNC: 31.5 GM/DL — LOW (ref 32–36)
MCV RBC AUTO: 86 FL — SIGNIFICANT CHANGE UP (ref 80–100)
NRBC # BLD: 0 /100 WBCS — SIGNIFICANT CHANGE UP (ref 0–0)
PLATELET # BLD AUTO: 155 K/UL — SIGNIFICANT CHANGE UP (ref 150–400)
POTASSIUM SERPL-MCNC: 3.9 MMOL/L — SIGNIFICANT CHANGE UP (ref 3.5–5.3)
POTASSIUM SERPL-SCNC: 3.9 MMOL/L — SIGNIFICANT CHANGE UP (ref 3.5–5.3)
RBC # BLD: 4.28 M/UL — SIGNIFICANT CHANGE UP (ref 3.8–5.2)
RBC # FLD: 16.3 % — HIGH (ref 10.3–14.5)
SODIUM SERPL-SCNC: 138 MMOL/L — SIGNIFICANT CHANGE UP (ref 135–145)
WBC # BLD: 6.43 K/UL — SIGNIFICANT CHANGE UP (ref 3.8–10.5)
WBC # FLD AUTO: 6.43 K/UL — SIGNIFICANT CHANGE UP (ref 3.8–10.5)

## 2019-10-03 PROCEDURE — 99232 SBSQ HOSP IP/OBS MODERATE 35: CPT

## 2019-10-03 PROCEDURE — 72170 X-RAY EXAM OF PELVIS: CPT | Mod: 26

## 2019-10-03 RX ORDER — ENOXAPARIN SODIUM 100 MG/ML
40 INJECTION SUBCUTANEOUS
Qty: 0 | Refills: 0 | DISCHARGE
Start: 2019-10-03

## 2019-10-03 RX ORDER — DOCUSATE SODIUM 100 MG
1 CAPSULE ORAL
Qty: 0 | Refills: 0 | DISCHARGE
Start: 2019-10-03

## 2019-10-03 RX ORDER — PANTOPRAZOLE SODIUM 20 MG/1
1 TABLET, DELAYED RELEASE ORAL
Qty: 0 | Refills: 0 | DISCHARGE
Start: 2019-10-03

## 2019-10-03 RX ORDER — MAGNESIUM HYDROXIDE 400 MG/1
30 TABLET, CHEWABLE ORAL
Qty: 0 | Refills: 0 | DISCHARGE
Start: 2019-10-03

## 2019-10-03 RX ORDER — ASCORBIC ACID 60 MG
1 TABLET,CHEWABLE ORAL
Qty: 0 | Refills: 0 | DISCHARGE
Start: 2019-10-03

## 2019-10-03 RX ORDER — POLYETHYLENE GLYCOL 3350 17 G/17G
17 POWDER, FOR SOLUTION ORAL
Qty: 0 | Refills: 0 | DISCHARGE
Start: 2019-10-03

## 2019-10-03 RX ORDER — ACETAMINOPHEN 500 MG
3 TABLET ORAL
Qty: 0 | Refills: 0 | DISCHARGE
Start: 2019-10-03

## 2019-10-03 RX ADMIN — Medication 100 MILLIGRAM(S): at 05:54

## 2019-10-03 RX ADMIN — Medication 500 MILLIGRAM(S): at 17:05

## 2019-10-03 RX ADMIN — Medication 100 MILLIGRAM(S): at 07:50

## 2019-10-03 RX ADMIN — Medication 975 MILLIGRAM(S): at 23:00

## 2019-10-03 RX ADMIN — SIMVASTATIN 20 MILLIGRAM(S): 20 TABLET, FILM COATED ORAL at 22:01

## 2019-10-03 RX ADMIN — Medication 975 MILLIGRAM(S): at 06:55

## 2019-10-03 RX ADMIN — Medication 975 MILLIGRAM(S): at 15:00

## 2019-10-03 RX ADMIN — Medication 1 TABLET(S): at 11:56

## 2019-10-03 RX ADMIN — POLYETHYLENE GLYCOL 3350 17 GRAM(S): 17 POWDER, FOR SOLUTION ORAL at 11:56

## 2019-10-03 RX ADMIN — Medication 975 MILLIGRAM(S): at 14:04

## 2019-10-03 RX ADMIN — Medication 50 MILLIGRAM(S): at 05:54

## 2019-10-03 RX ADMIN — Medication 100 MILLIGRAM(S): at 14:04

## 2019-10-03 RX ADMIN — SODIUM CHLORIDE 100 MILLILITER(S): 9 INJECTION INTRAMUSCULAR; INTRAVENOUS; SUBCUTANEOUS at 05:58

## 2019-10-03 RX ADMIN — ENOXAPARIN SODIUM 40 MILLIGRAM(S): 100 INJECTION SUBCUTANEOUS at 07:49

## 2019-10-03 RX ADMIN — RISPERIDONE 2 MILLIGRAM(S): 4 TABLET ORAL at 17:05

## 2019-10-03 RX ADMIN — DONEPEZIL HYDROCHLORIDE 5 MILLIGRAM(S): 10 TABLET, FILM COATED ORAL at 22:01

## 2019-10-03 RX ADMIN — Medication 975 MILLIGRAM(S): at 05:58

## 2019-10-03 RX ADMIN — Medication 500 MILLIGRAM(S): at 05:58

## 2019-10-03 RX ADMIN — Medication 1 MILLIGRAM(S): at 11:56

## 2019-10-03 RX ADMIN — Medication 100 MILLIGRAM(S): at 00:15

## 2019-10-03 RX ADMIN — Medication 975 MILLIGRAM(S): at 22:01

## 2019-10-03 RX ADMIN — RISPERIDONE 2 MILLIGRAM(S): 4 TABLET ORAL at 09:17

## 2019-10-03 RX ADMIN — Medication 100 MILLIGRAM(S): at 22:01

## 2019-10-03 RX ADMIN — SERTRALINE 100 MILLIGRAM(S): 25 TABLET, FILM COATED ORAL at 11:55

## 2019-10-03 NOTE — OCCUPATIONAL THERAPY INITIAL EVALUATION ADULT - ANTICIPATED DISCHARGE DISPOSITION, OT EVAL
short term rehab to prevent fall , improve balance, cognition, enable patient to safely perform ADL management and functional mobility./rehabilitation facility

## 2019-10-03 NOTE — DIETITIAN INITIAL EVALUATION ADULT. - OTHER INFO
Pt's  & sons were @ bedside. As per , pt c poor oral intake for the past few months, was mainly eating cookies & muffins @ home.  Family is very  concerned about pt's poor oral intake & change in mental status.  As per family, pt c poor acceptance to nutritional supplements.  Food preferences obtained, pt likes mashed potato, used to eat pudding, ice cream, yogurt etc., will try fruit smoothie c peanut butter c meals as protein-calorie supplement.  Diet education on high calorie, high protein diet provided to family.

## 2019-10-03 NOTE — PROGRESS NOTE ADULT - ASSESSMENT
Right Femoral Neck Fracture status post fall  s/p right hip hemiarthroplasty POD # 1  DC IV fluids  gregg to be removed  physical therapy    Hypertension - stable  continue current dose of beta blocker    Hyperlipidemia  continue statin therapy    Advanced dementia  supportive care  continue Aricept

## 2019-10-03 NOTE — OCCUPATIONAL THERAPY INITIAL EVALUATION ADULT - PERSONAL SAFETY AND JUDGMENT, REHAB EVAL
Pt needs verbal cues for proper hand placement and to safely maneuver rolling walker. Pt also needs consistent reminders to safely incorporate THP with ADL., functional mobility and to avoid internal rotation of left hip during turns./at risk behaviors demonstrated at risk behaviors demonstrated/Pt needs verbal cues for proper hand placement and to safely maneuver rolling walker. Pt also needs consistent reminders to safely incorporate THP with ADL., functional mobility and to avoid internal rotation of right hip during turns.

## 2019-10-03 NOTE — OCCUPATIONAL THERAPY INITIAL EVALUATION ADULT - IMPAIRMENTS CONTRIBUTING IMPAIRED BED MOBILITY, REHAB EVAL
decreased ROM/decreased flexibility/pain/impaired postural control/decreased strength/impaired balance/cognition

## 2019-10-03 NOTE — OCCUPATIONAL THERAPY INITIAL EVALUATION ADULT - SOCIAL CONCERNS
Complex psychosocial needs/coping issues/Pt's voiced concerns about her recovery. Pt is more confused due to new surrounding and care giver. Pt is more receptive with her  at bedside. Complex psychosocial needs/coping issues/Pt's voiced concerns about her recovery. Pt is more confused due to new surrounding and care giver. Pt is more receptive to staff with her  at bedside.

## 2019-10-03 NOTE — OCCUPATIONAL THERAPY INITIAL EVALUATION ADULT - PLANNED THERAPY INTERVENTIONS, OT EVAL
ADL retraining/balance training/joint mobilization/ROM/IADL retraining/cognitive, visual perceptual/fine motor coordination training/neuromuscular re-education/parent/caregiver training.../energy conservation techniques/bed mobility training/strengthening/stretching/transfer training

## 2019-10-03 NOTE — OCCUPATIONAL THERAPY INITIAL EVALUATION ADULT - GENERAL OBSERVATIONS, REHAB EVAL
Pt was seen for initial OT consult, encountered OOB to chair  with family at bedside; pt was AA&O to self, confused imulsive & tangential. Pt cooperated with heavy prodding & followed command 1 step inconsistently. Pt needed verbal cues for initiating , sequencing & focusing on tasks. Posterior THP were reviewed & maintained. Pt c/o right hip pain due to s/p FX/CLIFF; this limits pt's activity tolerance ,balance, ADL management, ROM, muscle strength & functional mobility.

## 2019-10-03 NOTE — PROGRESS NOTE ADULT - SUBJECTIVE AND OBJECTIVE BOX
Patient is seen and examined at bedside. Denies complaints.    Vital Signs Last 24 Hrs  T(C): 36.4 (03 Oct 2019 05:19), Max: 36.8 (02 Oct 2019 18:30)  T(F): 97.6 (03 Oct 2019 05:19), Max: 98.3 (02 Oct 2019 18:30)  HR: 79 (03 Oct 2019 05:19) (61 - 96)  BP: 142/78 (03 Oct 2019 05:19) (101/43 - 159/69)  BP(mean): --  RR: 18 (03 Oct 2019 05:19) (13 - 20)  SpO2: 98% (03 Oct 2019 05:19) (93% - 100%)    GEN: NAD  ABD: soft, NT/ND; no rebound or guarding;  Neurologic: Alert, confused.   Right hip: Prineo Dressing C/D/I. abduction pillow in place  B/L LE's: Motor intact + EHL/FHL/TA/GS in the BL LE. Sensation is grossly intact B/L. Extremities warm B/L. Compartments soft, compressible B/L, no calf tenderness B/L. DP 2+ B/L.    Labs:                          11.6   6.43  )-----------( 155      ( 03 Oct 2019 06:35 )             36.8       10-03    138  |  103  |  14  ----------------------------<  110<H>  3.9   |  27  |  0.60    Ca    8.2<L>      03 Oct 2019 06:35    TPro  7.5  /  Alb  3.4  /  TBili  0.6  /  DBili  x   /  AST  17  /  ALT  23  /  AlkPhos  58  10-01      A/P: Patient is a 84y y/o Female s/p right hip hemiarthroplasty, POD # 1  -wound care, isometric exercises, GI motility, new medications, hip precautions,  hospital course and discharge planning reviewed with pt  -Pain control/analgesia: Tylenol. Hold narcotics  -Inc spirometry reviewed and counseled  -Venodynes/foot pumps  -PT/OT/WBAT  -Anticoagulation: Lovenox  -Pt seen in am with Dr Saravia  -[facility] rehab

## 2019-10-03 NOTE — OCCUPATIONAL THERAPY INITIAL EVALUATION ADULT - RANGE OF MOTION EXAMINATION, LOWER EXTREMITY
Left LE Active ROM was WFL (within functional limits)/Left LE Passive ROM was WFL (w/i functional limits)/AAROM in right hip is 0-45 degrees with pain

## 2019-10-03 NOTE — OCCUPATIONAL THERAPY INITIAL EVALUATION ADULT - PERTINENT HX OF CURRENT PROBLEM, REHAB EVAL
Pt presented to ER  due to s/p fall. Pt is diagnosed with fracture  hip.  X-ray on 10/1/19 results confirmed comminuted minimally displaced subcapital right . Pt underwent s/p right CLIFF on 10/2/19 Pt presented to ER  due to s/p fall. Pt is diagnosed with fracture hip.  X-ray on 10/1/19 results confirmed comminuted minimally displaced subcapital right . Pt underwent s/p right CLIFF on 10/2/19

## 2019-10-03 NOTE — OCCUPATIONAL THERAPY INITIAL EVALUATION ADULT - BALANCE TRAINING, PT EVAL
Pt with increased standing balance from fair+ to good in 6-8 weeks to prevent falls, optimize pt's ability for ADL management & safely navigate in all terrains

## 2019-10-03 NOTE — DIETITIAN INITIAL EVALUATION ADULT. - PHYSICAL APPEARANCE
other (specify)/BMI=20.6(10/01/19) underweight/other (specify)/BMI=20.0(10/01/19) Nutrition focused physical exam conducted; Subcutaneous fat Exam;  [ Moderate   ]  Orbital fat pads region,  [ Moderate  ]Buccal fat region,  [ Moderate  ]triceps region, [ Unable  ]ribs region.  Muscle Exam; [ Moderate   ]temples region, [ Moderate  ]clavicle region, [ Moderate  ]shoulder region, [ Unable   ]Scapula region, [ WNL  ]Interosseous region, [ Unable   ]thigh region, [  Unable ]Calf region

## 2019-10-03 NOTE — OCCUPATIONAL THERAPY INITIAL EVALUATION ADULT - ADL RETRAINING, OT EVAL
Pt will dress upper body with min assist and 3-4 verbal cue to adhere to all posterior hip precaution with 6-8 weeks Pt will dress upper body with min assist and 3-4 verbal cues to adhere to all posterior hip precautions with 6-8 weeks

## 2019-10-03 NOTE — PROGRESS NOTE ADULT - SUBJECTIVE AND OBJECTIVE BOX
Patient is a 84y old  Female who presents with a chief complaint of Right Hip Fracture status post fall at home.    INTERVAL HPI/OVERNIGHT EVENTS:  Non-verbal, resting comfortably.    MEDICATIONS  (STANDING):  acetaminophen   Tablet .. 975 milliGRAM(s) Oral every 8 hours  ascorbic acid 500 milliGRAM(s) Oral two times a day  docusate sodium 100 milliGRAM(s) Oral three times a day  donepezil 5 milliGRAM(s) Oral at bedtime  enoxaparin Injectable 40 milliGRAM(s) SubCutaneous daily  folic acid 1 milliGRAM(s) Oral daily  influenza   Vaccine 0.5 milliLiter(s) IntraMuscular once  metoprolol succinate ER 50 milliGRAM(s) Oral daily  multivitamin 1 Tablet(s) Oral daily  pantoprazole    Tablet 40 milliGRAM(s) Oral before breakfast  polyethylene glycol 3350 17 Gram(s) Oral daily  risperiDONE   Tablet 2 milliGRAM(s) Oral two times a day  sertraline 100 milliGRAM(s) Oral daily  simvastatin 20 milliGRAM(s) Oral at bedtime  sodium chloride 0.9%. 1000 milliLiter(s) (100 mL/Hr) IV Continuous <Continuous>    MEDICATIONS  (PRN):  aluminum hydroxide/magnesium hydroxide/simethicone Suspension 30 milliLiter(s) Oral four times a day PRN Indigestion  magnesium hydroxide Suspension 30 milliLiter(s) Oral every 12 hours PRN Constipation  ondansetron Injectable 4 milliGRAM(s) IV Push every 6 hours PRN Nausea and/or Vomiting  senna 2 Tablet(s) Oral at bedtime PRN Constipation    Allergies:  No Known Allergies    REVIEW OF SYSTEMS:  All other systems reviewed and are negative    Vital Signs Last 24 Hrs  T(C): 36.4 (03 Oct 2019 05:19), Max: 36.8 (02 Oct 2019 18:30)  T(F): 97.6 (03 Oct 2019 05:19), Max: 98.3 (02 Oct 2019 18:30)  HR: 79 (03 Oct 2019 05:19) (61 - 96)  BP: 142/78 (03 Oct 2019 05:19) (101/43 - 159/69)  BP(mean): --  RR: 18 (03 Oct 2019 05:19) (13 - 20)  SpO2: 98% (03 Oct 2019 05:19) (93% - 100%)    PHYSICAL EXAM:  GENERAL: NAD, well-groomed, well-developed  HEAD:  Atraumatic, Normocephalic  EYES: EOMI, PERRLA, conjunctiva and sclera clear  ENMT: No tonsillar erythema, exudates, or enlargement; Moist mucous membranes, Good dentition, No lesions  NECK: Supple, No JVD, Normal thyroid  NERVOUS SYSTEM:  sleepy, uncooperative with neurology exam, no gross focal deficits  CHEST/LUNG: Clear to percussion bilaterally; No rales, rhonchi, wheezing, or rubs  HEART: Regular rate and rhythm; No murmurs, rubs, or gallops  ABDOMEN: Soft, Nontender, Nondistended; Bowel sounds present  EXTREMITIES:  2+ Peripheral Pulses, No clubbing, cyanosis, or edema  LYMPH: No lymphadenopathy noted  SKIN: No rashes or lesions    Labs                      11.6   6.43  )-----------( 155      ( 03 Oct 2019 06:35 )             36.8     10    138  |  103  |  14  ----------------------------<  110<H>  3.9   |  27  |  0.60    Ca    8.2<L>      03 Oct 2019 06:35    TPro  7.5  /  Alb  3.4  /  TBili  0.6  /  DBili  x   /  AST  17  /  ALT  23  /  AlkPhos  58  10    PT/INR - ( 01 Oct 2019 11:51 )   PT: 12.1 sec;   INR: 1.08 ratio    PTT - ( 01 Oct 2019 11:51 )  PTT:27.0 sec    CARDIAC MARKERS ( 01 Oct 2019 11:51 )  <.015 ng/mL / x     / x     / x     / x        Urinalysis Basic - ( 01 Oct 2019 16:15 )    Color: Yellow / Appearance: Clear / S.010 / pH: x  Gluc: x / Ketone: Negative  / Bili: Negative / Urobili: Negative mg/dL   Blood: x / Protein: Negative mg/dL / Nitrite: Negative   Leuk Esterase: Negative / RBC: x / WBC x   Sq Epi: x / Non Sq Epi: x / Bacteria: x    Culture - Urine (collected 01 Oct 2019 22:45)  Source: .Urine  Final Report (02 Oct 2019 20:13):    <10,000 CFU/mL Normal Urogenital Oralia    < from: CT 3D Reconstruct w/ Workstation (10.01.19 @ 14:10) >  Impression:    Acute comminuted right femoral neck fracture involving both the   subcapital and basicervical regions.    Nondisplaced fracture along the left-sided marginal osteophyte at the   level of L4.    < from: CT Head No Cont (10.01.19 @ 14:10) >  Impression:  1. no acute findings.    < from: Xray Chest 1 View AP/PA. (10.01.19 @ 11:55) >  Technique: XR CHEST portable AP    Comparison:None    Findings:  Lines: None    Heart/Mediastinum/Lungs: The heart size is normal.The lungs are   clear.There are no pleural effusions.    EKG - NSR    DVT prophylaxis: SCD's

## 2019-10-03 NOTE — OCCUPATIONAL THERAPY INITIAL EVALUATION ADULT - ADDITIONAL COMMENTS
Prior to admission, pt was functioning in her roles, self sufficient & ambulating independently without any assistive devices. Presently ,pt needs assistance with functional mobility and self care tasks due to pain, weakness, stiffness and decreased ROM from hip fracture and s/p CLIFF. Pt is right hand dominant .

## 2019-10-03 NOTE — OCCUPATIONAL THERAPY INITIAL EVALUATION ADULT - LIVES WITH, PROFILE
spouse/, son and his wife in a private house with 5 entry steps , equipped with hand rails . Once inside, pt has to negotiate a flight of stairs , with single hand rail to access the bedroom and bathroom. The bathroom has a walk in shower, fixed shower head and standard toilet. spouse/, son and his wife in a private house with 5 entry steps, equipped with hand rails . Once inside, pt has to negotiate a flight of stairs, with a single hand rail to access the bedroom and bathroom. The bathroom has a walk in shower, fixed shower head and standard toilet.

## 2019-10-03 NOTE — DIETITIAN INITIAL EVALUATION ADULT. - ENERGY NEEDS
Height (cm): 165.1 (10-03)  Weight (kg): 54.4 (10-01)  BMI (kg/m2): 20 (10-03)  IBW: 56.6 kg         % IBW:  96%         UBW: 54.4 kg kg           %UBW: 100%  ? adm wt. was stated wt.

## 2019-10-03 NOTE — OCCUPATIONAL THERAPY INITIAL EVALUATION ADULT - TRANSFER SAFETY CONCERNS NOTED: BED/CHAIR, REHAB EVAL
decreased balance during turns/squat pivot/decreased proprioception/stand pivot/decreased step length/decreased weight-shifting ability/losing balance/decreased sequencing ability/decreased safety awareness

## 2019-10-03 NOTE — DIETITIAN INITIAL EVALUATION ADULT. - PERTINENT MEDS FT
MEDICATIONS  (STANDING):  acetaminophen   Tablet .. 975 milliGRAM(s) Oral every 8 hours  ascorbic acid 500 milliGRAM(s) Oral two times a day  docusate sodium 100 milliGRAM(s) Oral three times a day  donepezil 5 milliGRAM(s) Oral at bedtime  enoxaparin Injectable 40 milliGRAM(s) SubCutaneous daily  folic acid 1 milliGRAM(s) Oral daily  influenza   Vaccine 0.5 milliLiter(s) IntraMuscular once  metoprolol succinate ER 50 milliGRAM(s) Oral daily  multivitamin 1 Tablet(s) Oral daily  pantoprazole    Tablet 40 milliGRAM(s) Oral before breakfast  polyethylene glycol 3350 17 Gram(s) Oral daily  risperiDONE   Tablet 2 milliGRAM(s) Oral two times a day  sertraline 100 milliGRAM(s) Oral daily  simvastatin 20 milliGRAM(s) Oral at bedtime    MEDICATIONS  (PRN):  aluminum hydroxide/magnesium hydroxide/simethicone Suspension 30 milliLiter(s) Oral four times a day PRN Indigestion  magnesium hydroxide Suspension 30 milliLiter(s) Oral every 12 hours PRN Constipation  ondansetron Injectable 4 milliGRAM(s) IV Push every 6 hours PRN Nausea and/or Vomiting  senna 2 Tablet(s) Oral at bedtime PRN Constipation

## 2019-10-03 NOTE — OCCUPATIONAL THERAPY INITIAL EVALUATION ADULT - IMPAIRED TRANSFERS: BED/CHAIR, REHAB EVAL
narrow base of support/pain/decreased flexibility/decreased ROM/impaired balance/decreased strength/cognition

## 2019-10-03 NOTE — OCCUPATIONAL THERAPY INITIAL EVALUATION ADULT - IADL RETRAINING, OT EVAL
Pt will dress lower body with min assist/ use of adaptive device and 3-4 verbal cue to adhere to all posterior hip precaution with 6-8 week Pt will dress lower body with min assist/ use of adaptive device and 3-4 verbal cues to adhere to all posterior hip precautions with 6-8 week

## 2019-10-03 NOTE — DIETITIAN INITIAL EVALUATION ADULT. - PERTINENT LABORATORY DATA
10-03 Na138 mmol/L Glu 110 mg/dL<H> K+ 3.9 mmol/L Cr  0.60 mg/dL BUN 14 mg/dL 10-01 Alb 3.4 g/dL10-01 ALT 23 U/L AST 17 U/L Alkaline Phosphatase 58 U/L  10-03   Hgb 11.6 g/dL Hct 36.8 %

## 2019-10-03 NOTE — OCCUPATIONAL THERAPY INITIAL EVALUATION ADULT - PRECAUTIONS/LIMITATIONS, REHAB EVAL
fall precautions/Pt has cognitive regression and no awareness of her limitation due to advanced Dementia

## 2019-10-03 NOTE — PHYSICAL THERAPY INITIAL EVALUATION ADULT - TRANSFER TRAINING, PT EVAL
In 4 weeks, patient will demonstrate independent and safe transfers on and off lying and sitting surfaces without assistance

## 2019-10-03 NOTE — CHART NOTE - NSCHARTNOTEFT_GEN_A_CORE
Upon Nutritional Assessment by the Registered Dietitian your patient was determined to meet criteria / has evidence of the following diagnosis/diagnoses:          [ ]  Mild Protein Calorie Malnutrition        [ x]  Moderate Protein Calorie Malnutrition        [ ] Severe Protein Calorie Malnutrition        [ ] Unspecified Protein Calorie Malnutrition        [ ] Underweight / BMI <19        [ ] Morbid Obesity / BMI > 40      Findings as based on:  •  Comprehensive nutrition assessment and consultation  •  Calorie counts (nutrient intake analysis)  •  Food acceptance and intake status from observations by staff  •  Follow up  •  Patient education  •  Intervention secondary to interdisciplinary rounds  •   concerns      Treatment:    The following diet has been recommended:  regular diet, will provide food preferences, add fruit smoothie made c peanut butter c meals as protein calorie supplement(pt c poor acceptance to commercial supplements ie., Ensure)      PROVIDER Section:     By signing this assessment you are acknowledging and agree with the diagnosis/diagnoses assigned by the Registered Dietitian    Comments:

## 2019-10-03 NOTE — PHYSICAL THERAPY INITIAL EVALUATION ADULT - PASSIVE RANGE OF MOTION EXAMINATION, REHAB EVAL
Pt received supine in bed A & O x 4 , pt forgetful, pt in NAD, bed alarm on.
no Passive ROM deficits were identified/right hip 0 - 30 PROM

## 2019-10-03 NOTE — OCCUPATIONAL THERAPY INITIAL EVALUATION ADULT - LIGHT TOUCH SENSATION, RLE, REHAB EVAL
Pt needing refill on his water pill, please call.   He stated he called yesterday and someone was going to place a message back.   His insurance will pay for a 90 day supply - if you will please prescribe that way.     
within normal limits

## 2019-10-04 ENCOUNTER — TRANSCRIPTION ENCOUNTER (OUTPATIENT)
Age: 84
End: 2019-10-04

## 2019-10-04 VITALS
SYSTOLIC BLOOD PRESSURE: 145 MMHG | DIASTOLIC BLOOD PRESSURE: 74 MMHG | TEMPERATURE: 97 F | RESPIRATION RATE: 15 BRPM | OXYGEN SATURATION: 96 % | HEART RATE: 76 BPM

## 2019-10-04 LAB
ANION GAP SERPL CALC-SCNC: 7 MMOL/L — SIGNIFICANT CHANGE UP (ref 5–17)
BUN SERPL-MCNC: 13 MG/DL — SIGNIFICANT CHANGE UP (ref 7–23)
CALCIUM SERPL-MCNC: 8 MG/DL — LOW (ref 8.5–10.1)
CHLORIDE SERPL-SCNC: 102 MMOL/L — SIGNIFICANT CHANGE UP (ref 96–108)
CO2 SERPL-SCNC: 27 MMOL/L — SIGNIFICANT CHANGE UP (ref 22–31)
CREAT SERPL-MCNC: 0.57 MG/DL — SIGNIFICANT CHANGE UP (ref 0.5–1.3)
GLUCOSE SERPL-MCNC: 75 MG/DL — SIGNIFICANT CHANGE UP (ref 70–99)
HCT VFR BLD CALC: 36.5 % — SIGNIFICANT CHANGE UP (ref 34.5–45)
HGB BLD-MCNC: 11.6 G/DL — SIGNIFICANT CHANGE UP (ref 11.5–15.5)
MAGNESIUM SERPL-MCNC: 2.2 MG/DL — SIGNIFICANT CHANGE UP (ref 1.6–2.6)
MCHC RBC-ENTMCNC: 27.4 PG — SIGNIFICANT CHANGE UP (ref 27–34)
MCHC RBC-ENTMCNC: 31.8 GM/DL — LOW (ref 32–36)
MCV RBC AUTO: 86.3 FL — SIGNIFICANT CHANGE UP (ref 80–100)
NRBC # BLD: 0 /100 WBCS — SIGNIFICANT CHANGE UP (ref 0–0)
PHOSPHATE SERPL-MCNC: 2.5 MG/DL — SIGNIFICANT CHANGE UP (ref 2.5–4.5)
PLATELET # BLD AUTO: 157 K/UL — SIGNIFICANT CHANGE UP (ref 150–400)
POTASSIUM SERPL-MCNC: 3.3 MMOL/L — LOW (ref 3.5–5.3)
POTASSIUM SERPL-SCNC: 3.3 MMOL/L — LOW (ref 3.5–5.3)
RBC # BLD: 4.23 M/UL — SIGNIFICANT CHANGE UP (ref 3.8–5.2)
RBC # FLD: 16.2 % — HIGH (ref 10.3–14.5)
SODIUM SERPL-SCNC: 136 MMOL/L — SIGNIFICANT CHANGE UP (ref 135–145)
WBC # BLD: 7.15 K/UL — SIGNIFICANT CHANGE UP (ref 3.8–10.5)
WBC # FLD AUTO: 7.15 K/UL — SIGNIFICANT CHANGE UP (ref 3.8–10.5)

## 2019-10-04 PROCEDURE — 99232 SBSQ HOSP IP/OBS MODERATE 35: CPT

## 2019-10-04 RX ORDER — POTASSIUM CHLORIDE 20 MEQ
40 PACKET (EA) ORAL ONCE
Refills: 0 | Status: DISCONTINUED | OUTPATIENT
Start: 2019-10-04 | End: 2019-10-04

## 2019-10-04 RX ORDER — CYCLOBENZAPRINE HYDROCHLORIDE 10 MG/1
10 TABLET, FILM COATED ORAL ONCE
Refills: 0 | Status: COMPLETED | OUTPATIENT
Start: 2019-10-04 | End: 2019-10-04

## 2019-10-04 RX ORDER — SODIUM,POTASSIUM PHOSPHATES 278-250MG
1 POWDER IN PACKET (EA) ORAL
Qty: 0 | Refills: 0 | DISCHARGE
Start: 2019-10-04 | End: 2019-10-06

## 2019-10-04 RX ORDER — SODIUM,POTASSIUM PHOSPHATES 278-250MG
1 POWDER IN PACKET (EA) ORAL
Refills: 0 | Status: DISCONTINUED | OUTPATIENT
Start: 2019-10-04 | End: 2019-10-04

## 2019-10-04 RX ADMIN — ENOXAPARIN SODIUM 40 MILLIGRAM(S): 100 INJECTION SUBCUTANEOUS at 08:48

## 2019-10-04 RX ADMIN — Medication 975 MILLIGRAM(S): at 06:17

## 2019-10-04 RX ADMIN — Medication 100 MILLIGRAM(S): at 05:23

## 2019-10-04 RX ADMIN — Medication 50 MILLIGRAM(S): at 05:22

## 2019-10-04 RX ADMIN — Medication 975 MILLIGRAM(S): at 05:17

## 2019-10-04 RX ADMIN — Medication 500 MILLIGRAM(S): at 05:23

## 2019-10-04 RX ADMIN — CYCLOBENZAPRINE HYDROCHLORIDE 10 MILLIGRAM(S): 10 TABLET, FILM COATED ORAL at 00:41

## 2019-10-04 RX ADMIN — PANTOPRAZOLE SODIUM 40 MILLIGRAM(S): 20 TABLET, DELAYED RELEASE ORAL at 08:48

## 2019-10-04 RX ADMIN — RISPERIDONE 2 MILLIGRAM(S): 4 TABLET ORAL at 05:24

## 2019-10-04 NOTE — PROGRESS NOTE ADULT - SUBJECTIVE AND OBJECTIVE BOX
Patient is a 84y old  Female who presents with a chief complaint of Right Hip Fracture status post fall at home.    INTERVAL HPI/OVERNIGHT EVENTS:  Non-verbal, resting comfortably.    MEDICATIONS  (STANDING):  acetaminophen   Tablet .. 975 milliGRAM(s) Oral every 8 hours  ascorbic acid 500 milliGRAM(s) Oral two times a day  docusate sodium 100 milliGRAM(s) Oral three times a day  donepezil 5 milliGRAM(s) Oral at bedtime  enoxaparin Injectable 40 milliGRAM(s) SubCutaneous daily  folic acid 1 milliGRAM(s) Oral daily  influenza   Vaccine 0.5 milliLiter(s) IntraMuscular once  metoprolol succinate ER 50 milliGRAM(s) Oral daily  multivitamin 1 Tablet(s) Oral daily  pantoprazole    Tablet 40 milliGRAM(s) Oral before breakfast  polyethylene glycol 3350 17 Gram(s) Oral daily  potassium acid phosphate/sodium acid phosphate tablet (K-PHOS No. 2) 1 Tablet(s) Oral four times a day with meals  potassium chloride   Powder 40 milliEquivalent(s) Oral once  risperiDONE   Tablet 2 milliGRAM(s) Oral two times a day  sertraline 100 milliGRAM(s) Oral daily  simvastatin 20 milliGRAM(s) Oral at bedtime    MEDICATIONS  (PRN):  aluminum hydroxide/magnesium hydroxide/simethicone Suspension 30 milliLiter(s) Oral four times a day PRN Indigestion  bisacodyl Suppository 10 milliGRAM(s) Rectal daily PRN If no bowel movement by POD#2  magnesium hydroxide Suspension 30 milliLiter(s) Oral every 12 hours PRN Constipation  ondansetron Injectable 4 milliGRAM(s) IV Push every 6 hours PRN Nausea and/or Vomiting  senna 2 Tablet(s) Oral at bedtime PRN Constipation    Allergies:  No Known Allergies    REVIEW OF SYSTEMS:  All other systems reviewed and are negative    Vital Signs Last 24 Hrs  T(C): 36.9 (04 Oct 2019 05:10), Max: 37.1 (03 Oct 2019 17:30)  T(F): 98.4 (04 Oct 2019 05:10), Max: 98.8 (03 Oct 2019 17:30)  HR: 69 (04 Oct 2019 08:54) (63 - 86)  BP: 146/69 (04 Oct 2019 08:54) (112/53 - 146/69)  BP(mean): --  RR: 14 (04 Oct 2019 08:54) (14 - 18)  SpO2: 96% (04 Oct 2019 08:54) (94% - 96%)    PHYSICAL EXAM:  GENERAL: NAD, well-groomed, well-developed  HEAD:  Atraumatic, Normocephalic  EYES: EOMI, PERRLA, conjunctiva and sclera clear  ENMT: No tonsillar erythema, exudates, or enlargement; Moist mucous membranes, Good dentition, No lesions  NECK: Supple, No JVD, Normal thyroid  NERVOUS SYSTEM:  sleepy, uncooperative with neurology exam, no gross focal deficits  CHEST/LUNG: Clear to percussion bilaterally; No rales, rhonchi, wheezing, or rubs  HEART: Regular rate and rhythm; No murmurs, rubs, or gallops  ABDOMEN: Soft, Nontender, Nondistended; Bowel sounds present  EXTREMITIES:  2+ Peripheral Pulses, No clubbing, cyanosis, or edema  LYMPH: No lymphadenopathy noted  SKIN: No rashes or lesions    Labs                      11.6   7.15  )-----------( 157      ( 04 Oct 2019 06:15 )             36.5     10-04    136  |  102  |  13  ----------------------------<  75  3.3<L>   |  27  |  0.57    Ca    8.0<L>      04 Oct 2019 06:15  Phos  2.5     10-04  Mg     2.2     10-04    Culture - Urine (collected 01 Oct 2019 22:45)  Source: .Urine  Final Report (02 Oct 2019 20:13):    <10,000 CFU/mL Normal Urogenital Oralia    < from: CT 3D Reconstruct w/ Workstation (10.01.19 @ 14:10) >  Impression:    Acute comminuted right femoral neck fracture involving both the   subcapital and basicervical regions.    Nondisplaced fracture along the left-sided marginal osteophyte at the   level of L4.    < from: CT Head No Cont (10.01.19 @ 14:10) >  Impression:  1. no acute findings.    < from: Xray Chest 1 View AP/PA. (10.01.19 @ 11:55) >  Technique: XR CHEST portable AP    Comparison:None    Findings:  Lines: None    Heart/Mediastinum/Lungs: The heart size is normal.The lungs are   clear.There are no pleural effusions.    EKG - NSR    DVT prophylaxis: SCD's

## 2019-10-04 NOTE — DISCHARGE NOTE NURSING/CASE MANAGEMENT/SOCIAL WORK - NSDCFUADDAPPT_GEN_ALL_CORE_FT
Follow up with Dr. Saravia in 2 weeks. Please call [phone] for appointment.      Follow up with your primary care doctor within 1 week to monitor your blood work. Please call to make an appointment.    Please call your surgeon, if you have new onset of fevers, increased drainage, increased pain or increased redness around the incision site. Please return to the Emergency Department if you have chest pain or shortness of breath.

## 2019-10-04 NOTE — DISCHARGE NOTE NURSING/CASE MANAGEMENT/SOCIAL WORK - NSDCPEEMAIL_GEN_ALL_CORE
[facility] for Tobacco Control email [email or web address]

## 2019-10-04 NOTE — PROGRESS NOTE ADULT - REASON FOR ADMISSION
Right Hip Fracture

## 2019-10-04 NOTE — DISCHARGE NOTE NURSING/CASE MANAGEMENT/SOCIAL WORK - NSDCPEWEB_GEN_ALL_CORE
[facility] for Tobacco Control website --- [email or web address] --- [email or web address]

## 2019-10-04 NOTE — PROGRESS NOTE ADULT - SUBJECTIVE AND OBJECTIVE BOX
Patient is seen and examined at bedside. Denies complaints.   \  Vital Signs Last 24 Hrs  T(C): 36.9 (04 Oct 2019 05:10), Max: 37.1 (03 Oct 2019 17:30)  T(F): 98.4 (04 Oct 2019 05:10), Max: 98.8 (03 Oct 2019 17:30)  HR: 73 (04 Oct 2019 05:10) (63 - 86)  BP: 144/67 (04 Oct 2019 05:10) (112/53 - 144/75)  BP(mean): --  RR: 16 (04 Oct 2019 05:10) (16 - 18)  SpO2: 96% (04 Oct 2019 05:10) (94% - 98%)    GEN: NAD  ABD: soft, NT/ND; no rebound or guarding;  Neurologic: Alert, confused. No distress.   RLE: Prineo dressing thigh- C/D/I. abduction pillow in place  B/L LE's: Motor intact + EHL/FHL/TA/GS in the BL LE. Sensation is grossly intact B/L. Extremities warm B/L. Compartments soft, compressible B/L, no calf tenderness B/L. DP 2+ B/L.    Labs:                          11.6   7.15  )-----------( 157      ( 04 Oct 2019 06:15 )             36.5       10-04    136  |  102  |  13  ----------------------------<  75  3.3<L>   |  27  |  0.57    Ca    8.0<L>      04 Oct 2019 06:15        A/P: Patient is a 84y y/o Female s/p right hip hemiarthroplasty, POD # 1   -wound care, isometric exercises, GI motility, new medications, hip precautions,  hospital course and discharge planning reviewed with pt  -Pain control/analgesia: Tylenol: no narcotics  -Inc spirometry reviewed and counseled  -Venodynes/foot pumps  -Hypokalemia; replete KCL  -PT/OT/WBAT  -Anticoagulation: lovenox 40mg SQ daily  -Pt seen in am with Dr Saravia  -ADRI plan: rehab today

## 2019-10-04 NOTE — PROGRESS NOTE ADULT - ASSESSMENT
Right Femoral Neck Fracture status post fall  s/p right hip hemiarthroplasty POD # 2  physical therapy/rehab    Hypokalemia  Kphos and K+ replacement ordered    Hypertension - stable  continue current dose of beta blocker    Hyperlipidemia  continue statin therapy    Advanced dementia  supportive care  continue Aricept

## 2019-10-05 DIAGNOSIS — Z79.899 OTHER LONG TERM (CURRENT) DRUG THERAPY: ICD-10-CM

## 2019-10-05 DIAGNOSIS — S72.001A FRACTURE OF UNSPECIFIED PART OF NECK OF RIGHT FEMUR, INITIAL ENCOUNTER FOR CLOSED FRACTURE: ICD-10-CM
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
## 2019-10-05 DIAGNOSIS — E78.5 HYPERLIPIDEMIA, UNSPECIFIED: ICD-10-CM
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
## 2019-10-05 DIAGNOSIS — F03.90 UNSPECIFIED DEMENTIA WITHOUT BEHAVIORAL DISTURBANCE: ICD-10-CM

## 2019-10-05 DIAGNOSIS — I10 ESSENTIAL (PRIMARY) HYPERTENSION: ICD-10-CM

## 2019-10-07 LAB — SURGICAL PATHOLOGY STUDY: SIGNIFICANT CHANGE UP

## 2020-12-09 NOTE — PATIENT PROFILE ADULT - FALL HARM RISK CONCLUSION
-- DO NOT REPLY / DO NOT REPLY ALL --  -- Message is from the [facility]--    COVID-19 Universal Screening: N/A - Not about scheduling    General Patient Message      Reason for Call: Patient would like to confirm that all pre-op paper work was sent to Dr Ness.. fax #  [phone]. Patient is scheduled for surgery tomorrow at 1 PM    Caller Information       Type Contact Phone    12/09/2020 01:17 PM CST Phone (Incoming) Mariya Schaeffer (Self) [phone] (H)          Alternative phone number:     Turnaround time given to caller:   \"This message will be sent to [state Provider's name]. The clinical team will fulfill your request as soon as they review your message.\"     Fall with Harm Risk

## 2021-10-21 NOTE — PHYSICAL THERAPY INITIAL EVALUATION ADULT - ASSISTIVE DEVICE FOR TRANSFER: CHAIR/BED, REHAB EVAL
[FreeTextEntry1] : August 30, 2016. Patient is a 52-year-old man, who was finally diagnosed with rapid atrial fibrillation about one month ago. On July 24 related to drinking and smoking synthetic weed, etc. he was hospitalized at [facility]. He had severe palpitations and "his whole system went into shock". He had some vomitting and nauseousness, diaphoresis, and an uncomfortable feeling, but no chest pain. He was in rapid atrial fibrillation. He did make positive troponin enzymes, but was not recommended to have cardiac catheterization or even a stress test. He was hospitalized at [facility] for 6 days and echocardiogram supposedly showed no scar. He was discharged on cholesterol medications, but then he followed up with a cardiologist in [place] after being hospitalized there for 48 hours with a recurrence of his A. fib. He was placed on sotalol and did well for a while. On 26 August on 120 mg of sotalol, he broke through with an episode of A.fib. The cardiologist wanted to increase Sotalol to 160 mg, but the patient stayed at 120 and then came back to [place] and is here to see me. He has not had a stress test. He has no risk factors for coronary artery disease or a family history of coronary disease or arrhythmias. He claims he's been having symptoms for 20 years, and the episodes have lasted as long as an hour, and it is only with this episode in July, that he was finally diagnosed with atrial fibrillation. He has some shortness of breath going up stairs, but he says he has had this for years, and it has not progressed. Otherwise, with normal activity, and swimming, he does not get chest pain. While he smokes a lot of weed, he does not smoke cigarettes. He thinks he was told of a murmur recently and once as a kid, although I did not hear a murmur on exam. Has not had any syncope recently. He did have a duodenal ulcer about 10 years ago, not related to medications or NSAIDs, and not bleeding. It has not been an issue since. He is currently on sotalol 120 b.i.d., BuSpar, 5 mg b.i.d., and aspirin 81 mg q.d. However, in [facility] Electronic medical record, there are no other notes, but there is a list of medicines to be verified that includes diltiazem, calculus, Eliquis, as well as atorvastatin, and Crestor.\par September 9, 2016. The patient came for stress test and had to stop for shortness of breath at 6 minutes with a low heart rate on carvedilol. It looked like ST s were starting to go down, but did not meet criteria. APCs noted, but no atrial fibrillation. Recommended nuclear stress test and use that to help guide medicine versus statin for his hyperlipidemia.\par November 2, 2016. Patient here in followup. He did not have nuclear stress test. He is here in followup because he is running out of sotalol, but also he had 2 episodes of atrial fibrillation, which were severe and accompanied with chest pain. He admits he has not been taking it twice a day and occasionally will skip. He has not worked on his diet and in fact has gained weight. He does complain of some fatigue and shortness of breath. No exertional chest pain, however; he does not get the same chest pain with exertion that he gets during the atrial fibrillation. His EKG is sinus rhythm with nonspecific ST changes. After a long discussion with the patient and his sister, he promises to be rigid with his sotalol every 12 hours, and he is scheduling a nuclear stress test, and we will review his labs.\par November 18, 2016. Yesterday, the patient came for his nuclear stress test. He was in atrial flutter or fibrillation. His heart rate went as high as 199, and there were definite abnormal ST changes, but no chest pain. Blood pressure response was normal. The nuclear scan showed medium sized, mild defects, apical, inferior, mid to distal inferolateral that were reversible, and there was also transient ischemic dilatation of the LV with a ratio of 1.45. LVEF was 78% with normal wall motion post stress however. Patient returns today to review the findings and for reevaluation. I recommended coronary angiography and the patient and his sister are thinking about it. He is back in sinus rhythm.\par December 8, 2016. Patient had coronary angiography, which only showed a 30% lesion in the proximal circumflex. The other arteries had no obstruction and LVEF was normal at 70%. I had the patient switch over to flecainide 50 mg q.12 h.\par December 30, 2016. The patient is in sinus rhythm, however, he claims he has had a lot of breakthroughs and does not feel well on the flecainide. He could not be more specific and wanted to go back to the sotalol, although he has broken through 120 mg many times. After a long extensive discussion we agreed to try Rythmol  mg q.12 h.\par February 7, 2017. Patient called. He had never started the Rythmol. After much discussion he agreed to start the Rythmol and come in in 2 weeks.\par February 23, 2017. The patient is here in followup and is in rapid atrial fibrillation/flutter. He is tolerating the Rythmol well and thinks that most of the time he is doing okay. He was not aware that he was in atrial fibrillation today, although he did think he was yesterday. After long discussion about ablation, changing medications, increasing the dose, or using home telemetry to determine how often he truly is in atrial fibrillation he elected to go up on the Rythmol first. He is still not eager for invasive procedures.\par April 13, 2017. Patient finally returns as I would not renew his medication. He is in sinus rhythm. He says he had one or two bad days, but otherwise thinks he was in a normal rhythm most of the time. He is not  the most compliant when it comes to sticking to his q.12 h. regimen. For the last 3 days has been taking 325 mg twice a day instead of 425 as he ran out of pills.\par May 10, 2017. Patient is here because of cough and sputum. However, he admitted that after a while he decided once again to stop his Rythmol to "see what would happen" and sure enough on Saturday he could not walk to [facility] because he was out of breath and his heart was racing fast. He went back on the Rythmol and had another episode Sunday, but since then has been back in normal rhythm. He's been coughing with dark sputum for 2 days now, but no fever or chills. His EKG shows sinus rhythm with a normal QRS and QT. He is here with his wife so we had an extensive discussion about considering an ablation and about compliance with medication and he will be calling Dr. Get Hardin of [facility].\par \par July 24, 2019. First visit in over 2 years. He had an ablation with Dr. Hardin in 2017 but never continued the medications, even for just a few months and never followed up with Dr. Hardin. In January of this year was hospitalized at [facility] and it sounds like he again had substance abuse problems, mostly marijuana, but probably Ativan, and possibly other medications. He initially presented with lactic acidosis. At some point he did seem to be in sinus rhythm, but with a very bizarre EKG, and prolonged QT, which I believe, was thought to be from toxins. Eventually, was back in rapid A. Flutter and was discharged on Cardizem CD. His CHADS2-VASC score was 0 so no anticoag. No followup after that and he claims now that he had no insurance, but now that he does he came back. He has been having severe GI symptoms whenever he eats fatty, greasy foods, mostly, vomiting, and chest pain, and rapid heartbeat. Reportedly, he has an appointment with gastroenterology tomorrow. He is on absolutely no medications and is here in rapid atrial flutter with a heart rate of around 140. Seems to be tolerating it well, with no ischemic changes, no heart failure on exam, and blood pressure acceptable. I reviewed his notes from the hospitalization in January and reached out to Dr. Get Hardin for further information. In the meantime, I am placing him on bisoprolol 10 mg for additional rate control while he has his GI workup. Long discussion about compliance and self-destructive behavior.\par July 31, 2019. Patient returns in followup on bisoprolol. I spoke with Dr. Hardin after his last visit, who said that even though his CHADS2-VASC score was zero, normally he likes to anticoagulate these people after ablation, but because the patient was so erratic in his behavior and there was concern about drugs or drinking, he did not. He would be willing to do another ablation if necessary. Today he is back in sinus rhythm at 51. Labs from last week were within normal limits, except for his lipid profile, which we reviewed today.\par December 6, 2019.  Since last visit patient was hospitalized I believe twice once at [facility] and once at [place] with marijuana intoxication.  He remains in sinus rhythm as long as he was back on his bisoprolol.  He returns now run out of the bisoprolol and is back in A. fib at 130 but totally asymptomatic.  He is not on Xarelto but his CHADS2- VASC score is 0.  Still using marijuana.  Long discussion about compliance and advised him to set up another appointment with Dr. Hardin to discuss a second ablation.  (Last time after the ablation he stopped the beta-blocker right away rather than waiting 3 months.)\par April 22, 2020.  Telehealth visit. Narrative: On the whole patient has been doing well, remaining on the beta-blocker with rare atrial fibrillation. There was a lot of stress as his wife and father-in-law and mother-in-law had COVID-19 with his father-in-law being hospitalized. He was left on his own for some time and had what he thinks was just a panic attack. He still is smoking marijuana at least daily but he tries to keep it down to once a day. His wife spoke to me about what to do when he has some of these episodes because he gets very nauseous and he gets very anxious and she almost wants to call EMS. I explained to have him breathe in and out of a paper bag as the first step otherwise we can try a little bit of alprazolam or a little bit of Zofran. (At the end the patient left and I spoke with the wife because he did not want to hear about his anxiety attacks). We also discussed follow-up with Dr. Hardin of [facility] for possible ablation given his recurrence of atrial fibrillation although most of the time if he is compliant with his beta-blocker he is in sinus rhythm. Currently he is on no anticoagulation because of his low CHADS2-VASC score.\par Assessment: For the most part stable in terms of his atrial fibrillation. Marijuana abuse still somewhat of a problem along with anxiety but on the whole functioning better than he had previously.\par Continue beta-blocker. Alprazolam 0.5 mg as needed and Zofran 4 mg as needed.\par Follow-up: 2 months\par July 2, 2020.Issues with nauseousness and vomiting and going to the emergency room at [facility].\par July 5, 2020. Once again diagnosed with marijuana intoxication. Remained in sinus rhythm throughout. Was discharged on July 3. \par December 2, 2020.Patient had issues again with marijuana overdose with nausea and vomiting and lethargy and finally agreed to go to rehab.  However at rehab was found to be in rapid atrial fibrillation and was sent to [facility].  He was there for a few days and I kept in contact with the physicians there.  They were having trouble controlling his rate and he was placed on IV amiodarone as CT angio showed a large pericardial effusion although by echo it was more mild to moderate and no hemodynamic compromise.  Sed rate was 86.  Initial plan was to have a thoracic surgery just to a pericardial window and send off the fluid for labs and serologies and cytology etc.  However he had been placed on 650 mg of aspirin along with the colchicine so the surgery was canceled.  In the interim he was transferred to [facility]. \par December 10, 2020.  Patient here now with wife.  I reviewed the records from [facility] on the patient's wife's cell phone.  He had an extensive collagen vascular work-up that was totally negative looking for a cause for his pericarditis.  Also cardiolipin antibody negative.  He did have abnormal liver function tests that persisted.  A HIDA scan was negative.  His echo on December 2 showed a small to medium pleural effusion pericardial effusion and a small left pleural effusion.  A follow-up echo the next day showed a small to mild effusion and bilateral pleural effusions but that was a limited study.  Patient was placed on amiodarone Cardizem metoprolol along with Eliquis and colchicine, and the patient left in sinus rhythm.  Here he is in sinus rhythm at 89 with abnormal ST-T changes diffusely.  He swears no more marijuana but he has done this before.\par Patient returns for echocardiogram at the end of the day.  Pericardial effusion gone.  No significant pleural effusion.  Otherwise mild S.A.M. but no gradient.  Mild to moderate MR.  Moderate LAE.  Normal LV systolic function normal RV systolic function.  Mild TR with RVSP 35.  Stopped Cardizem but continued bisoprolol amiodarone and colchicine along with his Eliquis.\par February 9, 2021.  Patient returns in follow-up.  EKG with sinus bradycardia at 53 with slight ST scooping and borderline QT prolongation.  Off colchicine but still on amiodarone, bisoprolol, and Eliquis.  Claims he has been a good boy, no marijuana etc.  Is willing to consider another ablation especially if that will help get him off the amiodarone.\par July 17, 2021.  Patient here in follow-up.  He saw Dr. Hardin April 2 and was still in sinus rhythm on amiodarone.  They agreed to schedule an ablation and then hopefully stop the amiodarone 3 months after the ablation, may be sooner.  The ablation was scheduled for May 26 however as of May 11 the patient on his own had stopped both the amiodarone and the Eliquis.  Eliquis was restarted on May 11.  MANNIE was done on 26 May showing mild to moderate MR, minimal AI, no left atrium or DANNY thrombus.  Normal LV and RV function.  Mild TR and PI.  Normal pericardium with no effusion.  No PFO.  He had the atrial flutter ablation and was discharged on the 27th.  There was a question of some ST depressions and it was suggested he repeat the stress test.  He is here today, in sinus rhythm at 50, with ST scooping in leads I to aVF V3 through 6.. He is on Eliquis and metoprolol, no amiodarone, no marijuana and only concerned that he cannot stop gaining weight.  He and his wife have not been vaccinated as they do not believe in it etc. I had a long discussion trying to convince him to get vaccinated for Covid.  He will be seeing Dr. Hardin at the end of July and I would like to bring him back for a stress echo at the end of August which will be 3 months after his ablation\par He saw Dr. Hardin on June 25 and then spoke with him in August.  Was supposed to get sleep studies for sleep apnea as he was found to have O2 sat of 86 and heart rate of 33 during sleep I believe after his Covid episode.  Was told to remain on metoprolol   daily.\par October 21, 2021.  Patient is now here in follow-up.  He is in sinus rhythm at 72 with in for lateral ST and T changes II, III, aVF V3 through 6, no significant change.  He survived Covid without having to be in the hospital, was treated with the [facility] program by Dr. Gita Lisker.  Has not had a full work-up for sleep apnea yet, probably because of insurance covering the sleep study.  He claims no marijuana use.  No palpitations occasional palpitations but no sustained arrhythmias.  Denies chest pain shortness of breath etc.  Weight is the same.  Discussed whether or not he can come off Eliquis; I decided to reevaluate in 4 months when it will be a year from his second ablation and then consider a 4-week ZIO monitor before stopping.  He will be having 1 more follow-up with Dr. Hardin and we will get his opinion as well.  He remains on metoprolol  daily
rolling walker

## 2022-01-05 NOTE — OCCUPATIONAL THERAPY INITIAL EVALUATION ADULT - PAIN SENSATION, LUE, REHAB EVAL
[FreeTextEntry3] : I, Maureen Peres, personally transcribed these services in the presence of Dr. Gus Lloyd MD. I, Dr. Gus Lloyd MD, personally performed the services described in this documentation as scribed by Maureen Peres in my presence and it is both accurate and complete. 
Yes-Patient/Caregiver accepts free interpretation services...
within normal limits

## 2022-11-27 NOTE — BRIEF OPERATIVE NOTE - NSICDXBRIEFOPLAUNCH_GEN_ALL_CORE
“Patient's name, , procedure and correct site were confirmed during the [facility] Timeout.” <--- Click to Launch ICDx for PreOp, PostOp and Procedure

## 2024-01-08 NOTE — ED ADULT NURSE NOTE - NSFALLRSKHRMRISKTYPE_ED_ALL_ED
I called patient and left message to call back to schedule colonoscopy.  First attempt.    Referring Physician: BRENDA Dozier    Procedure: Colonoscopy [id number]    Diagnosis:   COLONOSCOPY:  Screening for Colon Cancer  EGD:  N/A    DUE: mm/dd/year:  na (N/A if never scoped)    BMI over 50: No  There is no height or weight on file to calculate BMI.     Recent (within 6 months) Myocardial Infarction or Stroke: No    Cardiac assistive device (VAD's not including pacemakers or automatic defibrillators):  No    (If yes to any of the above 3 questions, location must be hospital)    Location: [facility]    GI Physician: Any GI Physician  (Select if already established with GI, otherwise indicate Any GI Physician)    Diabetic: NO     Blood Thinners: No/ vitamins    PHENTERMINE: NO      Pharmacy: (obtained at time of scheduling)           bones(Osteoporosis,prev fx,steroid use,metastatic bone ca)

## 2024-09-17 NOTE — DISCHARGE NOTE PROVIDER - NSDCHHPTASSISTHOME_GEN_ALL_CORE
[facility] encounter  HEMATOLOGY/ONCOLOGY OFFICE VISIT    CHIEF COMPLAINT:   Cancer      ONCOLOGIC HISTORY:  Malignant neoplasm of lower lobe of right lung  (CMD)  Stage III, possibly stage IV with gradually enlarging/new pulmonary nodules.  Adenocarcinoma.  PDL1 75%.  NGS KRAS G12D mutation.   Pembrolizumab beginning 3/27/2024.   CT 7/2/2024 - significant improvement in lung mass and LNs.  PFTs 7/30/2024-FEV1 40% DLCO 35%  RT + Taxol + Carboplatin beginning 8/26/2024.    HISTORY OF PRESENT ILLNESS:    Mercy Sutton is a 69 year old female who presents for evaluation and treatment of lung cancer, currently undergoing chemotherapy plus radiation with Taxol plus carboplatin.  Review of Dr. 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 She denies any instances of nausea or vomiting from the treatments. Swallowing difficulties are also not reported. She has attempted to alleviate the neuropathy by applying Icy Hot and tightly wrapping the area, but this has not provided relief. She experiences hot flashes, predominantly at night, but these are not severe.\"    INTERVAL HISTORY:   Mercy Sutton presents today for follow up of the above mentioned complaint. She is now due for cycle 1 day 22 carboplatin + paclitaxel with concurrent radiation for treatment of lung cancer. She is a bit frustrated that her chemo appts are scheduled so much later than her radiation appointments. She inquires if they can be scheduled a bit earlier for the next couple visits. Today Mercy Sutton reports feeling relatively well other than heartburn.   She denies any fevers or chills since last visit. She reports a good appetite, but has some odynophagia especially with liquids.   She complains of some nausea daily that is not improved whatsoever with prochlorperazine. She says it doesn't even help a little bit. She is  amenable to trying ondansetron prn. She denies any vomiting, diarrhea, constipation, blood in stool, or abdominal pain. She denies any urinary complaints, including gross hematuria or dysuria.   The patient denies any worsening of her chronic shortness of breath at rest, which she feels is related to her COPD. She denies any chest pain. She does get a tickle in the throat resulting in nonproductive cough.  She denies unusual headaches or dizziness. She has chronic numbness/tingling mostly in her right foot which predates chemotherapy. It has not significantly worsened. She continues on cymbalta 60mg PO daily and was recently advised to start gabapentin 300mg PO BID. APC previously confirmed with [facility] that this dosing is ok to utilize with her renal function.   She denies any bleeding diathesis including blood in stool, hematuria, epistaxis, hematemesis, hemoptysis, or gingival bleeding.       Please see the Review of Systems for highlighted pertinent positives.     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 300 mL 3    gabapentin (NEURONTIN) 300 MG capsule Take 1 capsule by mouth in the morning and 1 capsule in the evening. 90 capsule 1    fluticasone-salmeterol (Wixela  Inhub) 250-50 MCG/ACT inhaler Inhale 1 puff into the lungs in the morning and 1 puff in the evening. PLEASE CALL AND SCHEDULE APPOINTMENT WITH PULMONARY OFFICE TO AVOID DELAYS IN FUTURE REFILLS 180 each 0    black pepper-frankincense-lavender oil 5-2-2 % cream Apply 1 gram topically to right foot in the morning & at bedtime. If feet are affected, may apply to shins/calves also. Don't use on broken skin. If sensitivity occurs, stop, & call prescriber. 60 g 3    DULoxetine (CYMBALTA) 30 MG capsule Take 2 capsules by mouth as directed. TAKE 30MG DAILY X1 WEEK, THEN INCREASE TO 60MG DAILY THEREAFTER. 60 capsule 3    prochlorperazine (COMPAZINE) 10 MG tablet Take 1 tablet by mouth every 6 hours as needed for Nausea or Vomiting. (Patient not taking: Reported on 9/10/2024) 30 tablet 5    ondansetron (ZOFRAN) 8 MG tablet Take 1 tablet by mouth in the morning and 1 tablet in the evening. Take for 2 days post chemotherapy. (Patient not taking: Reported on 9/10/2024) 30 tablet 1    betamethasone dipropionate augmented (DIPROLENE AF) 0.05 % cream Apply topically 2 times daily.      albuterol 108 (90 Base) MCG/ACT inhaler INHALE 2 PUFFS BY MOUTH EVERY 4 HOURS AS NEEDED FOR WHEEZE OR SHORTNESS OF BREATH 18 each 0    atorvastatin (LIPITOR) 20 MG tablet TAKE 1 TABLET BY MOUTH EVERY DAY 90 tablet 1    AMIODarone (PACERONE) 100 MG tablet TAKE 1 TABLET BY MOUTH EVERY DAY 90 tablet 1    mometasone (ELOCON) 0.1 % cream Apply topically daily. 45 g 0    hydroCORTisone (CORTIZONE) 2.5 % cream Apply 1 Application topically in the morning and 1 Application in the evening. 30 g 5    ALPRAZolam (XANAX) 0.25 MG tablet Take 1 tablet by mouth twice monthly. Take 1-2 tablets 1 hour prior to immunotherapy every 2 weeks. 12 tablet 0    lidocaine-prilocaine (EMLA) 2.5-2.5 % cream Apply to the port site one hour before access 5 g 0    ferrous sulfate 325 (65 FE) MG tablet TAKE 1 TABLET BY MOUTH EVERY DAY WITH BREAKFAST 90 tablet 3    dilTIAZem  (CARDIZEM CD) 180 MG 24 hr capsule Take 1 capsule by mouth daily. 90 capsule 2    ipratropium-albuterol (DUONEB) 0.5-2.5 (3) MG/3ML nebulizer solution Take 3 mLs by nebulization 3 times daily as needed for Wheezing or Shortness of Breath. 360 mL 11    furosemide (LASIX) 20 MG tablet Take 1 tablet by mouth daily. 90 tablet 3    Acetaminophen (TYLENOL EXTRA STRENGTH PO) Take 2 tablets by mouth every 6 hours as needed.      aspirin 81 MG chewable tablet Chew 1 tablet by mouth daily.  0    calcium carbonate-vitamin D (CALTRATE+D) 600-400 MG-UNIT per tablet Take 1 tablet by mouth in the morning and 1 tablet in the evening.      cholecalciferol (VITAMIN D3) 1000 UNITS tablet Take 1,000 Units by mouth daily.      Multiple Vitamins-Minerals (ONE DAILY ADULTS 50+ PO) Take 1 tablet by mouth.       No current facility-administered medications for this visit.     ALLERGIES:   Allergen Reactions    Povidine-Iodine [Povidone Iodine] HIVES and PRURITUS       Review of systems:  Ivette Reyes MA  9/17/2024 10:02 AM  Sign when Signing Visit  Mercy Sutton is a 69 year old female here for  Chief Complaint   Patient presents with    Cancer     Denies latex allergy or sensitivity.    Medication verified, no changes.  PCP and Pharmacy verified.    Social History     Tobacco Use   Smoking Status Former    Current packs/day: 0.00    Average packs/day: 1 pack/day for 40.0 years (40.0 ttl pk-yrs)    Types: Cigarettes    Start date: 4/1/1981    Quit date: 4/1/2021    Years since quitting: 3.4   Smokeless Tobacco Never   Tobacco Comments    quit 3/29/2021     Advance Directives Filed: Yes    ECOG:   ECOG [09/17/24 0959]   ECOG Performance Status 1       Vitals:    Visit Vitals  BP (!) 148/73   Pulse 75   Temp 98 °F (36.7 °C) (Temporal)   Resp 17   Wt 61.1 kg (134 lb 13 oz)   LMP  (LMP Unknown)   SpO2 97%   BMI 25.29 kg/m²       These vital signs are:  Within defined parameters (Per Reference \"Defined Limits Hospital Outpatient  Department ([facility])\")    Height: No.  Ht Readings from Last 1 Encounters:   08/26/24 5' 1.22\" (1.555 m)     Weight:Yes, shoes on.  Wt Readings from Last 3 Encounters:   09/17/24 61.1 kg (134 lb 13 oz)   09/10/24 60.6 kg (133 lb 8 oz)   09/03/24 61.1 kg (134 lb 11.2 oz)       BMI: Body mass index is 25.29 kg/m².    REVIEW OF SYSTEMS  GENERAL:  Patient denies headache, fevers, chills, night sweats, excessive fatigue, change in appetite, weight loss, dizziness, but complains of: excessive fatigue  ALLERGIC/IMMUNOLOGIC: Verified allergies: Yes  EYES:  Patient denies significant visual difficulties, double vision, blurred vision  ENT/MOUTH: Patient denies problems with hearing, sore throat, sinus drainage, mouth sores, but complains of: sore throat  ENDOCRINE:  Patient denies diabetes, thyroid disease, hormone replacement, hot flashes, but complains of: hot flashes  HEMATOLOGIC/LYMPHATIC: Patient denies easy bruising, bleeding, tender lymph nodes, swollen lymph nodes, but complains of: easy bruising  BREASTS: Patient denies abnormal masses of breast, nipple discharge, pain  RESPIRATORY:  Patient denies lung pain with breathing, cough, coughing up blood, shortness of breath, but complains of: cough and shortness of breath  CARDIOVASCULAR:  Patient denies anginal chest pain, palpitations, shortness of breath when lying flat, peripheral edema  GASTROINTESTINAL: Patient denies abdominal pain , nausea, vomiting, diarrhea, GI bleeding, constipation, change in bowel habits, heartburn, sensation of feeling full, difficulty swallowing, but complains of: nausea  : Patient denies abnormal genital masses, blood in the urine, frequency, urgency, burning with urination, hesitancy, incontinence, vaginal bleeding, discharge  MUSCULOSKELETAL:  Patient denies joint pain, bone pain, joint swelling, redness, decreased range of motion  SKIN:  Patient denies chronic rashes, inflammation, ulcerations, skin changes, itching  NEUROLOGIC:  Patient  denies loss of balance, areas of focal weakness, abnormal gait, sensory problems, numbness, tingling, but complains of: numbness neuropathy  PSYCHIATRIC: Patient denies insomnia, depression, anxiety, but complains of: depression and anxiety    This patient reported abnormal symptoms that needed immediate verbal communication: No         PHYSICAL EXAM:  Vital Signs:   Oncology Encounter Vitals [09/17/24 0959]   ONC OP Encounter Vitals Group      BP (!) 148/73      Heart Rate 75      Resp 17      Temp 98 °F (36.7 °C)      Temp src Temporal      SpO2 97 %      Weight 134 lb 13 oz (61.1 kg)      Height       Pain Score  0      Pain Location       Pain Education?       BSA (Calculated - m2) - [employer]       BSA (Calculated - sq m)       BMI (Calculated)      ECOG Performance Status:   ECOG [09/17/24 0959]   ECOG Performance Status 1     General: The patient is alert, well-developed, well-nourished, no distress.  Skin: Warm, normal color, normal texture, normal turgor and without rash.  Head: Normocephalic, atraumatic.  Eyes: Normal conjunctivae and sclerae. Pupils equal, round, reactive to light and accommodation. Extraocular movements intact.  ENT: Oral mucosa is pink, moist, and without lesions. Normal external nose. Normal tongue. No mouth sores or thrush noted.   Cardiovascular: Regular rate and rhythm with no murmurs, gallops or rubs appreciated.  Respiratory: Good respiratory effort. Clear to auscultation bilaterally with no wheezes, rales, rhonchi, or crackles.   Extremities: No clubbing, no cyanosis, no edema in the bilateral lower extremities. Normal muscle tone and development bilaterally.  Neurologic: Motor strength normal. Coordination normal. No tremor noted.  Psychiatric: Cooperative. Appropriate mood and affect. Normal judgment.      DATA REVIEWED:  Laboratory Data:  Recent Labs   Lab 09/17/24  0932   WBC 4.1*   RBC 2.97*   HGB 8.9*   HCT 26.7*   MCV 89.9   *   Absolute Neutrophils 3.4    Absolute Lymphocytes 0.4*   Absolute Monocytes 0.3   Absolute Eosinophils  0.1   Absolute Basophils 0.0     Recent Labs   Lab 09/17/24  0932 09/10/24  0853 09/03/24  0937   Glucose 94   < > 95   Sodium 132*   < > 135   Potassium 3.5   < > 3.7   Chloride 95*   < > 98   Carbon Dioxide 28   < > 29   BUN 14   < > 20   Creatinine 1.21*   < > 1.20*   Calcium 8.6   < > 8.6   Magnesium  --   --  2.0   Protein, Total 6.5   < > 6.6   Albumin 3.6   < > 3.5*   GOT/AST 15   < > 14   Alkaline Phosphatase 69   < > 73   GPT 19   < > 24    < > = values in this interval not displayed.     Recent Labs   Lab 09/17/24  0932   Anion Gap 13   Globulin 2.9   Bilirubin, Total 0.3       Imaging Studies:NONE.    ASSESSMENT/PLAN:    Lung Cancer.  Comment: per review of Dr. Quijano's most recent note, \"Excellent response to pembrolizumab. Possibly stage III cancer rather than stage IV. Curative intent treatment. Taxol + Carboplatin + RT beginning 8/25/2024. She is currently undergoing chemotherapy with Taxol and carboplatin, along with daily radiation therapy. She seems to be tolerating treatment well. Her white blood cell count is satisfactory at 4.1. Mild anemia is present, with hemoglobin of 8.9 today.  Platelet count is mildly low at 131,000. No significant toxicity has been observed.   Plan:  proceed with cycle 1 day 22 of Taxol and carboplatin, maintaining the same dosage as previously administered. Daily radiation therapy will continue.     Encounter for antineoplastic chemotherapy  Comment: No grade 3/4 toxicities noted based on history, physical exam, and review of lab results.  Labs reviewed and determined to be adequate for continuation of treatment as detailed in the treatment plan.   Plan: Proceed with cycle 1 day 22 Taxol + Carboplatin No dose modifications necessary. Pre-medications as specified in the treatment plan. Supportive care and transfusion as needed.    Chemotherapy exacerbated neuropathy.  Comment: at last visit  with Dr. Quijano she reported \"persistent neuropathy in her right foot, which has not worsened with chemotherapy. Current medications are not providing relief. A prescription for neuropathy cream will be provided to manage her symptoms, particularly at night. She is advised to discontinue using Icy Hot as it has not been effective.\" She continues on cymbalta 60mg PO daily and was recently advised that she is ok to take gabapentin 300mg PO BID despite having only one kidney, as her renal function is sufficient for this dosage. With this she notes some mild improvement.   Plan: continue cymbalta 60mg PO daily along with gabapentin 300mg PO BID.     Anemia.  Comment: Today Hgb is 8.9. Based on today's counts, my interpretation of the data is that the hemoglobin is not low enough to compromise end organ perfusion and therefore,  PRBC (Packed Red Blood Cell)  transfusion is not needed.  Plan: Continue to observe and monitor. No intervention required at this time.     Chemotherapy related nausea and heartburn.   Comment: no benefit with compazine. Patient amenable to trying ondansetron PRN. She is agreeable to begin pantoprazole 20mg PO daily for heartburn treatment and prevention. Discussed this may also help with nausea prevention  Plan: discontinue compazine usage since it is offering no benefit to nausea control. Begin ondansetron 8mg PO q 8 hours prn nausea. Begin pantoprazole 20mg PO daily.       Summary:   Proceed with cycle 1 day 22 Taxol + Carboplatin  Continue radiation.   RTC as scheduled on 9/24/24 with Dr. Quijano for cycle 1 day 29 carbo/taxol in 1 week with CBC, CMP.   RTC with Verónica on 10/1 with 730am labs, 8am appt with Verónica (then goes for radiation), then her chemo after that.       The patient indicated understanding of the diagnosis, lab findings, and current plan of care.  The patient was encouraged to call with any interval concerning signs/symptoms or questions. All questions were answered to  the best of my ability.  Thank you for allowing me to participate in the care of this patient. Portions of this note were brought forward from Dr. Quijano's most recent note; reviewed and edited by me, as appropriate. Patient seen independently.          Verónica Giraldo PA-C  Supervising physician: Dr. Obinna Quijano and Dr. Donavan Minor  Hematology/Oncology   [facility]     Leaving Home is Contraindicated...

## 2025-02-04 NOTE — ED PROVIDER NOTE - PHYSICAL EXAMINATION
"To reach the Urogynecology nurses for Dr. Antonio and Samina Leonard, call [phone]. You will then select option 2 to be connected to the your provider's office and then option 3 for \"[facility]\". This will connect you with Cindy or Veena Rios.     Home Pessary Maintenance   - Remove, wash and place pessary back once weekly  - Use mild soap with water, rinse and dry it completely  - Reinsert using water soluble lubricant  - If there is discharge, it is OK to leave pessary out for 1-2 days at a time  - Most pessaries last for several years  - If the pessary were to fall into the commode, clean it with soap and water, and soak it for 20 minutes in rubbing alcohol. After this, soak it for 20 minutes in water, and wash again with soap and water. Rinse well. You may then insert it in the vagina.   " Gen: Alert, Well appearing. NAD    Head: NC, AT, PERRL, normal lids/conjunctiva   ENT: Bilateral TM WNL, patent oropharynx without erythema/exudate, uvula midline  Neck: supple, no tenderness/meningismus  Pulm: Bilateral clear BS, normal resp effort  CV: RRR, no M/R/G, +dist pulses   Abd: soft, NT/ND, +BS, no guarding/rebound tenderness  Mskel: no edema/erythema/cyanosis, + rt hip tenderness. good sensation, pulses intact.  Skin: no rash, no bruising  Neuro: AAO, no sensory/motor deficits, CN 2-12 intact